# Patient Record
Sex: MALE | Race: WHITE | NOT HISPANIC OR LATINO | URBAN - METROPOLITAN AREA
[De-identification: names, ages, dates, MRNs, and addresses within clinical notes are randomized per-mention and may not be internally consistent; named-entity substitution may affect disease eponyms.]

---

## 2018-09-19 ENCOUNTER — EMERGENCY (EMERGENCY)
Facility: HOSPITAL | Age: 48
LOS: 1 days | Discharge: HOME | End: 2018-09-19
Attending: EMERGENCY MEDICINE | Admitting: EMERGENCY MEDICINE
Payer: COMMERCIAL

## 2018-09-19 VITALS
RESPIRATION RATE: 17 BRPM | SYSTOLIC BLOOD PRESSURE: 102 MMHG | DIASTOLIC BLOOD PRESSURE: 73 MMHG | HEART RATE: 56 BPM | TEMPERATURE: 98 F | OXYGEN SATURATION: 100 %

## 2018-09-19 VITALS
OXYGEN SATURATION: 100 % | TEMPERATURE: 98 F | SYSTOLIC BLOOD PRESSURE: 153 MMHG | RESPIRATION RATE: 18 BRPM | DIASTOLIC BLOOD PRESSURE: 97 MMHG | HEART RATE: 81 BPM

## 2018-09-19 PROBLEM — Z00.00 ENCOUNTER FOR PREVENTIVE HEALTH EXAMINATION: Status: ACTIVE | Noted: 2018-09-19

## 2018-09-19 PROCEDURE — 99284 EMERGENCY DEPT VISIT MOD MDM: CPT

## 2018-09-19 RX ORDER — DIPHENHYDRAMINE HCL 50 MG
50 CAPSULE ORAL ONCE
Qty: 0 | Refills: 0 | Status: COMPLETED | OUTPATIENT
Start: 2018-09-19 | End: 2018-09-19

## 2018-09-19 RX ORDER — METOCLOPRAMIDE HCL 10 MG
10 TABLET ORAL ONCE
Qty: 0 | Refills: 0 | Status: COMPLETED | OUTPATIENT
Start: 2018-09-19 | End: 2018-09-19

## 2018-09-19 RX ORDER — SODIUM CHLORIDE 9 MG/ML
1000 INJECTION INTRAMUSCULAR; INTRAVENOUS; SUBCUTANEOUS ONCE
Qty: 0 | Refills: 0 | Status: COMPLETED | OUTPATIENT
Start: 2018-09-19 | End: 2018-09-19

## 2018-09-19 RX ORDER — OXYCODONE HYDROCHLORIDE 5 MG/1
5 TABLET ORAL ONCE
Qty: 0 | Refills: 0 | Status: DISCONTINUED | OUTPATIENT
Start: 2018-09-19 | End: 2018-09-19

## 2018-09-19 RX ORDER — KETOROLAC TROMETHAMINE 30 MG/ML
30 SYRINGE (ML) INJECTION ONCE
Qty: 0 | Refills: 0 | Status: DISCONTINUED | OUTPATIENT
Start: 2018-09-19 | End: 2018-09-19

## 2018-09-19 RX ADMIN — OXYCODONE HYDROCHLORIDE 5 MILLIGRAM(S): 5 TABLET ORAL at 10:06

## 2018-09-19 RX ADMIN — SODIUM CHLORIDE 3000 MILLILITER(S): 9 INJECTION INTRAMUSCULAR; INTRAVENOUS; SUBCUTANEOUS at 09:01

## 2018-09-19 RX ADMIN — Medication 50 MILLIGRAM(S): at 10:06

## 2018-09-19 RX ADMIN — OXYCODONE HYDROCHLORIDE 5 MILLIGRAM(S): 5 TABLET ORAL at 09:30

## 2018-09-19 RX ADMIN — Medication 10 MILLIGRAM(S): at 09:01

## 2018-09-19 RX ADMIN — Medication 30 MILLIGRAM(S): at 09:01

## 2018-09-19 RX ADMIN — Medication 30 MILLIGRAM(S): at 09:30

## 2018-09-19 NOTE — ED PROVIDER NOTE - PHYSICAL EXAMINATION
ENT: right TM effusion no erythema or sign of infection  Neuro: cranial nerves intact except pt blind and cannot access accomodation  5/5 strength   limited exam due to blindness 5/5 strength   limited exam due to blindness

## 2018-09-19 NOTE — ED PROVIDER NOTE - MEDICAL DECISION MAKING DETAILS
49 y/o M with PMHx of blindness and cluster headache presents to the ED complaining of right sided headache since 2 am today. Plan. treat symptomatically and reassess.

## 2018-09-19 NOTE — ED PROVIDER NOTE - OBJECTIVE STATEMENT
47 y/o M with PMHx of blindness and cluster headache presents to the ED complaining of right sided headache since 2 am today. Pt also reports nausea but no vomiting. Pt states his headaches usually began after ear infections. Pt states his most recent ear infection was 3 weeks ago while traveling to [place]. Pt states the headache initially began after the ear infection. Pt reports taking Tylenol for headache with no relief. Pt denies fever, chills, diarrhea or any other medical problems. 49 y/o M with PMHx of blindness and cluster headaches presents to the ED complaining of right sided headache since 2 am today. Pt also reports nausea but no vomiting. Pt states his headaches usually begin after ear infections. Pt states his most recent ear infection was 3 weeks ago while traveling to [place]. Pt states the headache initially began after the ear infection and has been intermittent but manageable at home until today. Pt reports taking Tylenol for headache with no relief. He normally take oxycodone but threw out his prescription because it . Pt denies fever, chills, and current ear pain.

## 2018-09-19 NOTE — ED PROVIDER NOTE - PROGRESS NOTE DETAILS
Delia: Pt headache improved but still 8/10 will give additional pain medication and reassess. Delia: Pt feeling much better will d.c home.

## 2018-12-15 ENCOUNTER — EMERGENCY (EMERGENCY)
Facility: HOSPITAL | Age: 48
LOS: 1 days | Discharge: HOME | End: 2018-12-15
Attending: EMERGENCY MEDICINE | Admitting: EMERGENCY MEDICINE
Payer: COMMERCIAL

## 2018-12-15 VITALS
HEART RATE: 85 BPM | SYSTOLIC BLOOD PRESSURE: 146 MMHG | TEMPERATURE: 98 F | DIASTOLIC BLOOD PRESSURE: 75 MMHG | OXYGEN SATURATION: 100 % | RESPIRATION RATE: 18 BRPM

## 2018-12-15 VITALS
DIASTOLIC BLOOD PRESSURE: 64 MMHG | HEART RATE: 69 BPM | SYSTOLIC BLOOD PRESSURE: 108 MMHG | OXYGEN SATURATION: 98 % | RESPIRATION RATE: 18 BRPM | TEMPERATURE: 98 F

## 2018-12-15 PROBLEM — G44.009 CLUSTER HEADACHE SYNDROME, UNSPECIFIED, NOT INTRACTABLE: Chronic | Status: ACTIVE | Noted: 2018-09-19

## 2018-12-15 PROBLEM — H54.7 UNSPECIFIED VISUAL LOSS: Chronic | Status: ACTIVE | Noted: 2018-09-19

## 2018-12-15 LAB
ALBUMIN SERPL ELPH-MCNC: 4.4 G/DL — SIGNIFICANT CHANGE UP (ref 3.3–5)
ALP SERPL-CCNC: 78 U/L — SIGNIFICANT CHANGE UP (ref 40–120)
ALT FLD-CCNC: 15 U/L — SIGNIFICANT CHANGE UP (ref 4–41)
APTT BLD: 31.9 SEC — SIGNIFICANT CHANGE UP (ref 27.5–36.3)
AST SERPL-CCNC: 15 U/L — SIGNIFICANT CHANGE UP (ref 4–40)
BASOPHILS # BLD AUTO: 0.03 K/UL — SIGNIFICANT CHANGE UP (ref 0–0.2)
BASOPHILS NFR BLD AUTO: 0.6 % — SIGNIFICANT CHANGE UP (ref 0–2)
BILIRUB SERPL-MCNC: 0.9 MG/DL — SIGNIFICANT CHANGE UP (ref 0.2–1.2)
BUN SERPL-MCNC: 14 MG/DL — SIGNIFICANT CHANGE UP (ref 7–23)
CALCIUM SERPL-MCNC: 9.5 MG/DL — SIGNIFICANT CHANGE UP (ref 8.4–10.5)
CHLORIDE SERPL-SCNC: 103 MMOL/L — SIGNIFICANT CHANGE UP (ref 98–107)
CO2 SERPL-SCNC: 25 MMOL/L — SIGNIFICANT CHANGE UP (ref 22–31)
CREAT SERPL-MCNC: 0.98 MG/DL — SIGNIFICANT CHANGE UP (ref 0.5–1.3)
EOSINOPHIL # BLD AUTO: 0.12 K/UL — SIGNIFICANT CHANGE UP (ref 0–0.5)
EOSINOPHIL NFR BLD AUTO: 2.2 % — SIGNIFICANT CHANGE UP (ref 0–6)
GLUCOSE SERPL-MCNC: 93 MG/DL — SIGNIFICANT CHANGE UP (ref 70–99)
HCT VFR BLD CALC: 39.2 % — SIGNIFICANT CHANGE UP (ref 39–50)
HGB BLD-MCNC: 13.6 G/DL — SIGNIFICANT CHANGE UP (ref 13–17)
IMM GRANULOCYTES # BLD AUTO: 0.02 # — SIGNIFICANT CHANGE UP
IMM GRANULOCYTES NFR BLD AUTO: 0.4 % — SIGNIFICANT CHANGE UP (ref 0–1.5)
INR BLD: 1.05 — SIGNIFICANT CHANGE UP (ref 0.88–1.17)
LIDOCAIN IGE QN: 31.7 U/L — SIGNIFICANT CHANGE UP (ref 7–60)
LYMPHOCYTES # BLD AUTO: 1.26 K/UL — SIGNIFICANT CHANGE UP (ref 1–3.3)
LYMPHOCYTES # BLD AUTO: 23.5 % — SIGNIFICANT CHANGE UP (ref 13–44)
MCHC RBC-ENTMCNC: 29.5 PG — SIGNIFICANT CHANGE UP (ref 27–34)
MCHC RBC-ENTMCNC: 34.7 % — SIGNIFICANT CHANGE UP (ref 32–36)
MCV RBC AUTO: 85 FL — SIGNIFICANT CHANGE UP (ref 80–100)
MONOCYTES # BLD AUTO: 0.48 K/UL — SIGNIFICANT CHANGE UP (ref 0–0.9)
MONOCYTES NFR BLD AUTO: 8.9 % — SIGNIFICANT CHANGE UP (ref 2–14)
NEUTROPHILS # BLD AUTO: 3.46 K/UL — SIGNIFICANT CHANGE UP (ref 1.8–7.4)
NEUTROPHILS NFR BLD AUTO: 64.4 % — SIGNIFICANT CHANGE UP (ref 43–77)
NRBC # FLD: 0 — SIGNIFICANT CHANGE UP
PLATELET # BLD AUTO: 136 K/UL — LOW (ref 150–400)
PMV BLD: 10.7 FL — SIGNIFICANT CHANGE UP (ref 7–13)
POTASSIUM SERPL-MCNC: 4.1 MMOL/L — SIGNIFICANT CHANGE UP (ref 3.5–5.3)
POTASSIUM SERPL-SCNC: 4.1 MMOL/L — SIGNIFICANT CHANGE UP (ref 3.5–5.3)
PROT SERPL-MCNC: 7.2 G/DL — SIGNIFICANT CHANGE UP (ref 6–8.3)
PROTHROM AB SERPL-ACNC: 11.7 SEC — SIGNIFICANT CHANGE UP (ref 9.8–13.1)
RBC # BLD: 4.61 M/UL — SIGNIFICANT CHANGE UP (ref 4.2–5.8)
RBC # FLD: 12.7 % — SIGNIFICANT CHANGE UP (ref 10.3–14.5)
SODIUM SERPL-SCNC: 142 MMOL/L — SIGNIFICANT CHANGE UP (ref 135–145)
WBC # BLD: 5.37 K/UL — SIGNIFICANT CHANGE UP (ref 3.8–10.5)
WBC # FLD AUTO: 5.37 K/UL — SIGNIFICANT CHANGE UP (ref 3.8–10.5)

## 2018-12-15 PROCEDURE — 99284 EMERGENCY DEPT VISIT MOD MDM: CPT

## 2018-12-15 PROCEDURE — 74177 CT ABD & PELVIS W/CONTRAST: CPT | Mod: 26

## 2018-12-15 RX ORDER — MOXIFLOXACIN HYDROCHLORIDE TABLETS, 400 MG 400 MG/1
1 TABLET, FILM COATED ORAL
Qty: 20 | Refills: 0
Start: 2018-12-15 | End: 2021-08-13

## 2018-12-15 RX ORDER — METRONIDAZOLE 500 MG
500 TABLET ORAL ONCE
Qty: 0 | Refills: 0 | Status: COMPLETED | OUTPATIENT
Start: 2018-12-15 | End: 2018-12-15

## 2018-12-15 RX ORDER — SODIUM CHLORIDE 9 MG/ML
1000 INJECTION INTRAMUSCULAR; INTRAVENOUS; SUBCUTANEOUS ONCE
Qty: 0 | Refills: 0 | Status: COMPLETED | OUTPATIENT
Start: 2018-12-15 | End: 2018-12-15

## 2018-12-15 RX ORDER — MOXIFLOXACIN HYDROCHLORIDE TABLETS, 400 MG 400 MG/1
1 TABLET, FILM COATED ORAL
Qty: 20 | Refills: 0 | OUTPATIENT
Start: 2018-12-15 | End: 2018-12-24

## 2018-12-15 RX ORDER — MOXIFLOXACIN HYDROCHLORIDE TABLETS, 400 MG 400 MG/1
1 TABLET, FILM COATED ORAL
Qty: 20 | Refills: 0
Start: 2018-12-15 | End: 2018-12-24

## 2018-12-15 RX ORDER — METRONIDAZOLE 500 MG
1 TABLET ORAL
Qty: 20 | Refills: 0 | OUTPATIENT
Start: 2018-12-15 | End: 2018-12-24

## 2018-12-15 RX ORDER — KETOROLAC TROMETHAMINE 30 MG/ML
30 SYRINGE (ML) INJECTION ONCE
Qty: 0 | Refills: 0 | Status: DISCONTINUED | OUTPATIENT
Start: 2018-12-15 | End: 2018-12-15

## 2018-12-15 RX ORDER — CIPROFLOXACIN LACTATE 400MG/40ML
500 VIAL (ML) INTRAVENOUS ONCE
Qty: 0 | Refills: 0 | Status: COMPLETED | OUTPATIENT
Start: 2018-12-15 | End: 2018-12-15

## 2018-12-15 RX ORDER — METRONIDAZOLE 500 MG
1 TABLET ORAL
Qty: 20 | Refills: 0
Start: 2018-12-15 | End: 2018-12-24

## 2018-12-15 RX ADMIN — Medication 30 MILLIGRAM(S): at 11:26

## 2018-12-15 RX ADMIN — Medication 500 MILLIGRAM(S): at 15:25

## 2018-12-15 RX ADMIN — SODIUM CHLORIDE 1000 MILLILITER(S): 9 INJECTION INTRAMUSCULAR; INTRAVENOUS; SUBCUTANEOUS at 11:27

## 2018-12-15 NOTE — ED PROVIDER NOTE - CARE PROVIDER_API CALL
Douglas Kauffman), Internal Medicine  [address] [place] [address]  Phone: [phone]  Fax: [phone]

## 2018-12-15 NOTE — ED PROVIDER NOTE - PROGRESS NOTE DETAILS
Last visit the patient describes some left heel pain. After interventions, this has resolved.   Marjorie CONNER: Patient reassessed - states pain has 'curbed' after medication but still feels it. Declining pain medication at this time. Awaiting CT. Labs results discussed in detail with patient. Marjorie CONNER: Discussed with radiology - hydronephrosis is chronic similar to 2009 scan. Marjorie CONNER: Results discussed in detail with patient and his wife. They were provided a copy. Options discussed - PO Abx and outpatient follow up vs CDU observation stay for IV antibiotics, pain control. Patient prefers to be discharged and follow up. He understands to return for worsening pain, fever, vomiting, new symptoms of concern. Will give first dose of antibiotics here. He has a GI doctor to follow up with. Marjorie CONNER: Attempted to call Dr. Kauffman to inform of work up and results at [phone]. Office is now closed.

## 2018-12-15 NOTE — ED PROVIDER NOTE - NSFOLLOWUPINSTRUCTIONS_ED_ALL_ED_FT
Please follow up with your GI doctor. Return for worsening symptoms of fever, abdominal pain, vomiting or new symptoms of concern.

## 2018-12-15 NOTE — ED PROVIDER NOTE - MEDICAL DECISION MAKING DETAILS
hx of diverticulitis, ttp in LLQ - concern for diverticulitis. Pt has hx of abscess w/o need for IR or surgical drainage. Will order labs including coags and check CT A/P. Pt does not like narcotic pain medication, agreeable to toradol for pain.

## 2018-12-15 NOTE — ED ADULT TRIAGE NOTE - CHIEF COMPLAINT QUOTE
pt c/o lower mid abd pain since yesterday.  pt was seen at PMD office today and was told to come to ED for evaluation of possible diverticulitis flare.  pt denies n/v/d

## 2018-12-15 NOTE — ED PROVIDER NOTE - OBJECTIVE STATEMENT
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 He states he has been fasting for his doctor's appointment today with his pcp, Dr. Douglas Kauffman. Denies fevers, nausea, vomiting. At the appointment, his pcp felt he may have diverticulitis or appendicitis and sent him in for evaluation. No diarrhea.

## 2019-12-09 ENCOUNTER — EMERGENCY (EMERGENCY)
Facility: HOSPITAL | Age: 49
LOS: 1 days | Discharge: HOME | End: 2019-12-09
Attending: EMERGENCY MEDICINE | Admitting: EMERGENCY MEDICINE
Payer: COMMERCIAL

## 2019-12-09 VITALS
SYSTOLIC BLOOD PRESSURE: 147 MMHG | DIASTOLIC BLOOD PRESSURE: 97 MMHG | HEART RATE: 92 BPM | TEMPERATURE: 98 F | RESPIRATION RATE: 18 BRPM | OXYGEN SATURATION: 99 %

## 2019-12-09 VITALS
RESPIRATION RATE: 18 BRPM | TEMPERATURE: 98 F | SYSTOLIC BLOOD PRESSURE: 132 MMHG | DIASTOLIC BLOOD PRESSURE: 79 MMHG | HEART RATE: 78 BPM | OXYGEN SATURATION: 97 %

## 2019-12-09 LAB
ALBUMIN SERPL ELPH-MCNC: 4.2 G/DL — SIGNIFICANT CHANGE UP (ref 3.3–5)
ALP SERPL-CCNC: 79 U/L — SIGNIFICANT CHANGE UP (ref 40–120)
ALT FLD-CCNC: 14 U/L — SIGNIFICANT CHANGE UP (ref 4–41)
ANION GAP SERPL CALC-SCNC: 15 MMO/L — HIGH (ref 7–14)
APPEARANCE UR: CLEAR — SIGNIFICANT CHANGE UP
AST SERPL-CCNC: 18 U/L — SIGNIFICANT CHANGE UP (ref 4–40)
BASE EXCESS BLDV CALC-SCNC: 0.5 MMOL/L — SIGNIFICANT CHANGE UP
BASOPHILS # BLD AUTO: 0.03 K/UL — SIGNIFICANT CHANGE UP (ref 0–0.2)
BASOPHILS NFR BLD AUTO: 0.4 % — SIGNIFICANT CHANGE UP (ref 0–2)
BILIRUB SERPL-MCNC: 0.4 MG/DL — SIGNIFICANT CHANGE UP (ref 0.2–1.2)
BILIRUB UR-MCNC: NEGATIVE — SIGNIFICANT CHANGE UP
BLOOD GAS VENOUS - CREATININE: 0.9 MG/DL — SIGNIFICANT CHANGE UP (ref 0.5–1.3)
BLOOD GAS VENOUS - FIO2: 21 — SIGNIFICANT CHANGE UP
BLOOD UR QL VISUAL: NEGATIVE — SIGNIFICANT CHANGE UP
BUN SERPL-MCNC: 13 MG/DL — SIGNIFICANT CHANGE UP (ref 7–23)
CALCIUM SERPL-MCNC: 9.5 MG/DL — SIGNIFICANT CHANGE UP (ref 8.4–10.5)
CHLORIDE BLDV-SCNC: 108 MMOL/L — SIGNIFICANT CHANGE UP (ref 96–108)
CHLORIDE SERPL-SCNC: 104 MMOL/L — SIGNIFICANT CHANGE UP (ref 98–107)
CO2 SERPL-SCNC: 21 MMOL/L — LOW (ref 22–31)
COLOR SPEC: SIGNIFICANT CHANGE UP
CREAT SERPL-MCNC: 0.9 MG/DL — SIGNIFICANT CHANGE UP (ref 0.5–1.3)
EOSINOPHIL # BLD AUTO: 0.24 K/UL — SIGNIFICANT CHANGE UP (ref 0–0.5)
EOSINOPHIL NFR BLD AUTO: 3.3 % — SIGNIFICANT CHANGE UP (ref 0–6)
GAS PNL BLDV: 139 MMOL/L — SIGNIFICANT CHANGE UP (ref 136–146)
GLUCOSE BLDV-MCNC: 89 MG/DL — SIGNIFICANT CHANGE UP (ref 70–99)
GLUCOSE SERPL-MCNC: 90 MG/DL — SIGNIFICANT CHANGE UP (ref 70–99)
GLUCOSE UR-MCNC: NEGATIVE — SIGNIFICANT CHANGE UP
HCO3 BLDV-SCNC: 24 MMOL/L — SIGNIFICANT CHANGE UP (ref 20–27)
HCT VFR BLD CALC: 42 % — SIGNIFICANT CHANGE UP (ref 39–50)
HCT VFR BLDV CALC: 44.6 % — SIGNIFICANT CHANGE UP (ref 39–51)
HGB BLD-MCNC: 14.4 G/DL — SIGNIFICANT CHANGE UP (ref 13–17)
HGB BLDV-MCNC: 14.5 G/DL — SIGNIFICANT CHANGE UP (ref 13–17)
IMM GRANULOCYTES NFR BLD AUTO: 0.3 % — SIGNIFICANT CHANGE UP (ref 0–1.5)
KETONES UR-MCNC: NEGATIVE — SIGNIFICANT CHANGE UP
LACTATE BLDV-MCNC: 3.9 MMOL/L — HIGH (ref 0.5–2)
LEUKOCYTE ESTERASE UR-ACNC: NEGATIVE — SIGNIFICANT CHANGE UP
LIDOCAIN IGE QN: 29.3 U/L — SIGNIFICANT CHANGE UP (ref 7–60)
LYMPHOCYTES # BLD AUTO: 1.87 K/UL — SIGNIFICANT CHANGE UP (ref 1–3.3)
LYMPHOCYTES # BLD AUTO: 26 % — SIGNIFICANT CHANGE UP (ref 13–44)
MCHC RBC-ENTMCNC: 29.4 PG — SIGNIFICANT CHANGE UP (ref 27–34)
MCHC RBC-ENTMCNC: 34.3 % — SIGNIFICANT CHANGE UP (ref 32–36)
MCV RBC AUTO: 85.9 FL — SIGNIFICANT CHANGE UP (ref 80–100)
MONOCYTES # BLD AUTO: 0.68 K/UL — SIGNIFICANT CHANGE UP (ref 0–0.9)
MONOCYTES NFR BLD AUTO: 9.5 % — SIGNIFICANT CHANGE UP (ref 2–14)
NEUTROPHILS # BLD AUTO: 4.34 K/UL — SIGNIFICANT CHANGE UP (ref 1.8–7.4)
NEUTROPHILS NFR BLD AUTO: 60.5 % — SIGNIFICANT CHANGE UP (ref 43–77)
NITRITE UR-MCNC: NEGATIVE — SIGNIFICANT CHANGE UP
NRBC # FLD: 0 K/UL — SIGNIFICANT CHANGE UP (ref 0–0)
PCO2 BLDV: 48 MMHG — SIGNIFICANT CHANGE UP (ref 41–51)
PH BLDV: 7.34 PH — SIGNIFICANT CHANGE UP (ref 7.32–7.43)
PH UR: 6 — SIGNIFICANT CHANGE UP (ref 5–8)
PLATELET # BLD AUTO: 154 K/UL — SIGNIFICANT CHANGE UP (ref 150–400)
PMV BLD: 10.3 FL — SIGNIFICANT CHANGE UP (ref 7–13)
PO2 BLDV: 40 MMHG — SIGNIFICANT CHANGE UP (ref 35–40)
POTASSIUM BLDV-SCNC: 3.7 MMOL/L — SIGNIFICANT CHANGE UP (ref 3.4–4.5)
POTASSIUM SERPL-MCNC: 4.1 MMOL/L — SIGNIFICANT CHANGE UP (ref 3.5–5.3)
POTASSIUM SERPL-SCNC: 4.1 MMOL/L — SIGNIFICANT CHANGE UP (ref 3.5–5.3)
PROT SERPL-MCNC: 7.1 G/DL — SIGNIFICANT CHANGE UP (ref 6–8.3)
PROT UR-MCNC: NEGATIVE — SIGNIFICANT CHANGE UP
RBC # BLD: 4.89 M/UL — SIGNIFICANT CHANGE UP (ref 4.2–5.8)
RBC # FLD: 12.7 % — SIGNIFICANT CHANGE UP (ref 10.3–14.5)
SAO2 % BLDV: 70.5 % — SIGNIFICANT CHANGE UP (ref 60–85)
SODIUM SERPL-SCNC: 140 MMOL/L — SIGNIFICANT CHANGE UP (ref 135–145)
SP GR SPEC: 1.01 — SIGNIFICANT CHANGE UP (ref 1–1.04)
UROBILINOGEN FLD QL: NORMAL — SIGNIFICANT CHANGE UP
WBC # BLD: 7.18 K/UL — SIGNIFICANT CHANGE UP (ref 3.8–10.5)
WBC # FLD AUTO: 7.18 K/UL — SIGNIFICANT CHANGE UP (ref 3.8–10.5)

## 2019-12-09 PROCEDURE — 99284 EMERGENCY DEPT VISIT MOD MDM: CPT

## 2019-12-09 RX ORDER — CIPROFLOXACIN LACTATE 400MG/40ML
1 VIAL (ML) INTRAVENOUS
Qty: 20 | Refills: 0
Start: 2019-12-09 | End: 2019-12-18

## 2019-12-09 RX ORDER — METRONIDAZOLE 500 MG
1 TABLET ORAL
Qty: 20 | Refills: 0
Start: 2019-12-09 | End: 2019-12-18

## 2019-12-09 RX ORDER — ACETAMINOPHEN 500 MG
650 TABLET ORAL ONCE
Refills: 0 | Status: COMPLETED | OUTPATIENT
Start: 2019-12-09 | End: 2019-12-09

## 2019-12-09 RX ORDER — METRONIDAZOLE 500 MG
500 TABLET ORAL ONCE
Refills: 0 | Status: COMPLETED | OUTPATIENT
Start: 2019-12-09 | End: 2019-12-09

## 2019-12-09 RX ORDER — KETOROLAC TROMETHAMINE 30 MG/ML
15 SYRINGE (ML) INJECTION ONCE
Refills: 0 | Status: DISCONTINUED | OUTPATIENT
Start: 2019-12-09 | End: 2019-12-09

## 2019-12-09 RX ORDER — SODIUM CHLORIDE 9 MG/ML
1000 INJECTION INTRAMUSCULAR; INTRAVENOUS; SUBCUTANEOUS ONCE
Refills: 0 | Status: COMPLETED | OUTPATIENT
Start: 2019-12-09 | End: 2019-12-09

## 2019-12-09 RX ORDER — CIPROFLOXACIN LACTATE 400MG/40ML
500 VIAL (ML) INTRAVENOUS ONCE
Refills: 0 | Status: COMPLETED | OUTPATIENT
Start: 2019-12-09 | End: 2019-12-09

## 2019-12-09 RX ORDER — METRONIDAZOLE 500 MG
1 TABLET ORAL
Qty: 20 | Refills: 0
Start: 2019-12-09 | End: 2021-08-13

## 2019-12-09 RX ADMIN — Medication 650 MILLIGRAM(S): at 08:06

## 2019-12-09 RX ADMIN — SODIUM CHLORIDE 1000 MILLILITER(S): 9 INJECTION INTRAMUSCULAR; INTRAVENOUS; SUBCUTANEOUS at 08:09

## 2019-12-09 RX ADMIN — Medication 500 MILLIGRAM(S): at 08:06

## 2019-12-09 RX ADMIN — SODIUM CHLORIDE 1000 MILLILITER(S): 9 INJECTION INTRAMUSCULAR; INTRAVENOUS; SUBCUTANEOUS at 06:33

## 2019-12-09 RX ADMIN — Medication 15 MILLIGRAM(S): at 06:34

## 2019-12-09 RX ADMIN — Medication 500 MILLIGRAM(S): at 08:07

## 2019-12-09 NOTE — ED PROVIDER NOTE - CLINICAL SUMMARY MEDICAL DECISION MAKING FREE TEXT BOX
48yo M w/ pmh of medically managed diverticulitis c/o mid lower abd pain and urinary frequency. Labs, ua, meds reassess. if no significant improvement or resolution of pain, will need CT AP.

## 2019-12-09 NOTE — ED PROVIDER NOTE - OBJECTIVE STATEMENT
Pertinent PMH/PSH/FHx/SHx and Review of Systems contained within:  50yo M, pmh of diverticulitis, c/o lower abd pain X approx 1 day, sharp, feels similar to prior episode of sigmoid diverticulitis. Does report urinary frequency (but pt is on flomax for BPH). No abd surgeries.  No fever/chills, No photophobia/eye pain/changes in vision, No ear pain/sore throat/dysphagia, No chest pain/palpitations, no SOB/cough/wheeze/stridor,  No N/V/D, no dysuria/discharge, No neck/back pain, no rash, no new focal neuro symptoms.

## 2019-12-09 NOTE — ED PROVIDER NOTE - PHYSICAL EXAMINATION
Gen: Alert, NAD  Head: NC, AT,  EOMI, normal lids/conjunctiva  ENT:  normal hearing, patent oropharynx without erythema/exudate  Neck: +supple, no tenderness/meningismus/JVD, +Trachea midline  Chest: no chest wall tenderness, equal chest rise  Pulm: Bilateral BS, normal resp effort, no wheeze/stridor/retractions  CV: RRR, no M/R/G, +dist pulses  Abd: +BS, soft, ND, ttp mid lower abd, no rebound  Rectal: deferred  Mskel: no edema/erythema/cyanosis  Skin: no rash  Neuro: AAOx3

## 2019-12-09 NOTE — ED PROVIDER NOTE - PATIENT PORTAL LINK FT
You can access the FollowMyHealth Patient Portal offered by [facility] by registering at the following website: [email or web address]. By joining [employer]’s FollowMyHealth portal, you will also be able to view your health information using other applications (apps) compatible with our system.

## 2019-12-09 NOTE — ED ADULT TRIAGE NOTE - CHIEF COMPLAINT QUOTE
Patient complains of midline-lower abdominal pain x 1 day, denies nausea/vomiting/diarrhea.  Endorses frequent urination, on Flomax for BPH.  States has a history of diverticulitis and this feels similar.  Denies abdominal surgeries.  Legally blind.

## 2019-12-09 NOTE — ED ADULT NURSE NOTE - OBJECTIVE STATEMENT
Pt having abdominal pain x 1 day. Hx of diverticulitis. Pt stating the pain feels the same has previous diverticulitis.

## 2019-12-09 NOTE — ED PROVIDER NOTE - PROGRESS NOTE DETAILS
Reassessed pt, stated pain down from "11/10 to 2/10" after toradol. Re-examined, only very mild ttp suprapubic. Discussed discharging home without  CT, treating w/ pain meds/abx and outpt GI f/u and pt agrees with plan. Gave return precautions.   -Dr. Mustafa

## 2019-12-09 NOTE — ED PROVIDER NOTE - NSFOLLOWUPINSTRUCTIONS_ED_ALL_ED_FT
Your symptoms are most consistent with a recurrence of your diverticulitis.     Please take the antibiotics as prescribed.    Take tylenol (650mg every 8 hours as needed for pain) or ibuprofen (600mg every 8 hours as needed for pain).  If one of these medications alone does not control your pain, you may take both at the same time.    Follow up with your primary doctor as soon as possible.    Return to the emergency department if you develop fever, chills, worsening pain despite taking the above medications, or if you develop any other symptoms that are concerning to you.

## 2021-05-24 NOTE — ED ADULT TRIAGE NOTE - PRO INTERPRETER NEED 2
English Double O-Z Flap Text: The defect edges were debeveled with a #15 scalpel blade.  Given the location of the defect, shape of the defect and the proximity to free margins a Double O-Z flap was deemed most appropriate.  Using a sterile surgical marker, an appropriate transposition flap was drawn incorporating the defect and placing the expected incisions within the relaxed skin tension lines where possible. The area thus outlined was incised deep to adipose tissue with a #15 scalpel blade.  The skin margins were undermined to an appropriate distance in all directions utilizing iris scissors.

## 2021-05-28 NOTE — ED PROVIDER NOTE - DATE/TIME 1
Progress Notes by Mehul Del Toro MD at 5/8/2019 7:45 AM   Author: Mehul Del Toro MD Author Type: Physician Filed: 5/8/2019  9:16 AM   Note Status: Signed Cosign: Cosign Not Required Encounter Date: 5/8/2019   : Mehul Del Toro MD (Physician)      Please inform patient that the blood pressure is in desired range.  Continue with current medications and plan.  Mehul Del Toro MD  5/8/2019           19-Sep-2018 09:48

## 2021-06-26 ENCOUNTER — EMERGENCY (EMERGENCY)
Facility: HOSPITAL | Age: 51
LOS: 1 days | Discharge: HOME | End: 2021-06-26
Attending: STUDENT IN AN ORGANIZED HEALTH CARE EDUCATION/TRAINING PROGRAM | Admitting: STUDENT IN AN ORGANIZED HEALTH CARE EDUCATION/TRAINING PROGRAM
Payer: COMMERCIAL

## 2021-06-26 VITALS
SYSTOLIC BLOOD PRESSURE: 143 MMHG | DIASTOLIC BLOOD PRESSURE: 81 MMHG | TEMPERATURE: 101 F | OXYGEN SATURATION: 99 % | HEART RATE: 106 BPM | RESPIRATION RATE: 19 BRPM

## 2021-06-26 PROCEDURE — 99284 EMERGENCY DEPT VISIT MOD MDM: CPT

## 2021-06-26 RX ORDER — SODIUM CHLORIDE 9 MG/ML
1000 INJECTION INTRAMUSCULAR; INTRAVENOUS; SUBCUTANEOUS ONCE
Refills: 0 | Status: COMPLETED | OUTPATIENT
Start: 2021-06-26 | End: 2021-06-26

## 2021-06-26 RX ORDER — MORPHINE SULFATE 50 MG/1
4 CAPSULE, EXTENDED RELEASE ORAL ONCE
Refills: 0 | Status: DISCONTINUED | OUTPATIENT
Start: 2021-06-26 | End: 2021-06-26

## 2021-06-26 RX ORDER — ACETAMINOPHEN 500 MG
650 TABLET ORAL ONCE
Refills: 0 | Status: COMPLETED | OUTPATIENT
Start: 2021-06-26 | End: 2021-06-26

## 2021-06-26 NOTE — ED PROVIDER NOTE - PHYSICAL EXAMINATION
Gen: Alert and oriented. Lying comfortably in bed. Answering questions appropriately  HEENT: extra occular movements intact, no nasal discharge, mucous membranes moist  CV: Regular rate and rhythm, +S1/S2, no murmurs/rubs/gallops,   Resp: Clear to ausculation bilaterally, no wheezes/rhonchi/rales  GI: Abdomen soft non-distended, ttp in suprapubic  and LLQ, no masses  MSK: No open wounds, no bruising, no LE edema, Homans sign negative bl  Neuro: A&Ox4, following commands, moving all four extremities spontaneously  Psych: appropriate mood

## 2021-06-26 NOTE — ED PROVIDER NOTE - NSFOLLOWUPINSTRUCTIONS_ED_ALL_ED_FT
You have diverticulitis.    Please  your antibiotic and take as directed.    You may use tylenol for pain. Please do not exceed 3250 mg in 24 hours. You may use ibuprofen for pain. Please do not exceed 3000 mg in 24 hours. For severe pain you may take oxycodone.    Please follow up with your GI for further evaluation.    PLEASE FOLLOW UP WITH YOUR UROLOGIST ASAP FOR HYDRONEPHROSIS. This condition may lead to irreversible kidney damage so it is important you follow up ASAP.     Diverticulitis    Diverticulitis is inflammation or infection of small pouches in your colon that form when you HAVE a condition called diverticulosis. This condition can range from mild to severe potentially leading to perforation or obstructions of your colon. Symptoms include abdominal pain, fever/chills, nausea, vomiting, diarrhea, constipation, or blood in your stool. If you were prescribed an antibiotic medicine, take it as told by your health care provider. Do not stop taking the antibiotic even if you start to feel better.    SEEK IMMEDIATE MEDICAL CARE IF YOU HAVE ANY OF THE FOLLOWING SYMPTOMS: worsening abdominal pain, high fever, inability to hold down liquids or medication, black or bloody stools, inability to pass gas, lightheadedness/dizziness, or a change in mental status.

## 2021-06-26 NOTE — ED PROVIDER NOTE - ATTENDING CONTRIBUTION TO CARE
52 yo M past medical history blindness from retinitis pigmentosa hx of diverticulitis presents for suprapubic pain x 3 days. denies fever chills but febrile in ED. denies cp, sob n/v/d/c, +urinary frequency and burning. exam as above. Ddx includes, but not limited to, diverticulitis, uti. plan: symptom relief prn, labs, ct, abx, reassess.

## 2021-06-26 NOTE — ED PROVIDER NOTE - NSFOLLOWUPCLINICS_GEN_ALL_ED_FT
[facility]  Urology  [address] [place] [address] - [address] [place] [address]  Phone: [phone]  Fax:   Follow Up Time: Urgent

## 2021-06-26 NOTE — ED PROVIDER NOTE - PROGRESS NOTE DETAILS
Joseph Frankel PGY2: Patient feeling better and tolerating PO. CT back with diverticulitis. Also showing progression of hydronephrosis of kidney to severe. Patient states he is aware of having hydronephrosis in the past and went to a urologist two summers ago where he did an US and "nothing came of it". Offered patient to be seen by urology given the progression but patient rather go home and follow up rapidly next week. Relayed to patient the importance of following up ASAP for this as it can cause irreversible kidney damage. Patient voiced his understanding and agreed to follow up ASAP. Has urologist he can follow with. He also would like referral information for [facility]. Will put him in for rapid referral and give him urology contact. Discussed plan and return precautions with patient who understands and agrees. All questions answered.

## 2021-06-26 NOTE — ED PROVIDER NOTE - NS ED ROS FT
Gen: Denies fever, +generalized weakness  CV: Denies chest pain, palpitations  HEENT: Denies neck pain, headache, vision changes  Skin: Denies rash, erythema, color changes  Resp: Denies SOB, cough  Endo: Denies sensitivity to heat, cold, increased urination  GI: Denies constipation, nausea, vomiting, diarrhea  Msk: Denies back pain, LE swelling, extremity pain  : Endorses dysuria, increased frequency  Neuro: Denies LOC, focal weakness, numbness, tingling  Psych: Denies hx of psych, SI, HI

## 2021-06-26 NOTE — ED PROVIDER NOTE - CLINICAL SUMMARY MEDICAL DECISION MAKING FREE TEXT BOX
Joseph Frankel PGY2: 50 yo M with suprapubic pain. Febrile and mildly tachy. Otherwise VSS. Patient looks well and is non toxic appearing. PE as above. Consider UTI vs diverticulitis. Will get labs, consider imaging based on UA, and treat symptomatically. Will reassess.

## 2021-06-26 NOTE — ED PROVIDER NOTE - OBJECTIVE STATEMENT
52 yo M with retinitis pigmentosa (legally blind), prior diverticulitis presenting for suprapubic pain x 3 days. Moderate to severe. Burning in nature. No modifying factors. Associated with chills, dysuria and increased frequency. States this is what his diverticulitis normally feels like. Denies n/v.

## 2021-06-26 NOTE — ED ADULT TRIAGE NOTE - CHIEF COMPLAINT QUOTE
Pt c/o sharp, mid-lower abdominal pain starting 3 days ago. Denies N/V/D. Hx of diverticulitis. Pt is legally blind.

## 2021-06-26 NOTE — ED PROVIDER NOTE - PATIENT PORTAL LINK FT
You can access the FollowMyHealth Patient Portal offered by [facility] by registering at the following website: [email or web address]. By joining [employer]’s FollowMyHealth portal, you will also be able to view your health information using other applications (apps) compatible with our system.

## 2021-06-27 VITALS
OXYGEN SATURATION: 98 % | HEART RATE: 75 BPM | SYSTOLIC BLOOD PRESSURE: 120 MMHG | TEMPERATURE: 98 F | RESPIRATION RATE: 18 BRPM | DIASTOLIC BLOOD PRESSURE: 75 MMHG

## 2021-06-27 LAB
ALBUMIN SERPL ELPH-MCNC: 4.3 G/DL — SIGNIFICANT CHANGE UP (ref 3.3–5)
ALP SERPL-CCNC: 80 U/L — SIGNIFICANT CHANGE UP (ref 40–120)
ALT FLD-CCNC: 12 U/L — SIGNIFICANT CHANGE UP (ref 4–41)
ANION GAP SERPL CALC-SCNC: 16 MMOL/L — HIGH (ref 7–14)
APPEARANCE UR: CLEAR — SIGNIFICANT CHANGE UP
AST SERPL-CCNC: 13 U/L — SIGNIFICANT CHANGE UP (ref 4–40)
BACTERIA # UR AUTO: NEGATIVE — SIGNIFICANT CHANGE UP
BASOPHILS # BLD AUTO: 0.02 K/UL — SIGNIFICANT CHANGE UP (ref 0–0.2)
BASOPHILS NFR BLD AUTO: 0.2 % — SIGNIFICANT CHANGE UP (ref 0–2)
BILIRUB SERPL-MCNC: 0.8 MG/DL — SIGNIFICANT CHANGE UP (ref 0.2–1.2)
BILIRUB UR-MCNC: NEGATIVE — SIGNIFICANT CHANGE UP
BUN SERPL-MCNC: 13 MG/DL — SIGNIFICANT CHANGE UP (ref 7–23)
CALCIUM SERPL-MCNC: 9.5 MG/DL — SIGNIFICANT CHANGE UP (ref 8.4–10.5)
CHLORIDE SERPL-SCNC: 103 MMOL/L — SIGNIFICANT CHANGE UP (ref 98–107)
CO2 SERPL-SCNC: 21 MMOL/L — LOW (ref 22–31)
COLOR SPEC: YELLOW — SIGNIFICANT CHANGE UP
CREAT SERPL-MCNC: 1.05 MG/DL — SIGNIFICANT CHANGE UP (ref 0.5–1.3)
DIFF PNL FLD: NEGATIVE — SIGNIFICANT CHANGE UP
EOSINOPHIL # BLD AUTO: 0.1 K/UL — SIGNIFICANT CHANGE UP (ref 0–0.5)
EOSINOPHIL NFR BLD AUTO: 1.2 % — SIGNIFICANT CHANGE UP (ref 0–6)
GLUCOSE SERPL-MCNC: 106 MG/DL — HIGH (ref 70–99)
GLUCOSE UR QL: NEGATIVE — SIGNIFICANT CHANGE UP
HCT VFR BLD CALC: 40.8 % — SIGNIFICANT CHANGE UP (ref 39–50)
HGB BLD-MCNC: 13.9 G/DL — SIGNIFICANT CHANGE UP (ref 13–17)
HOROWITZ INDEX BLDV+IHG-RTO: SIGNIFICANT CHANGE UP
IANC: 5.58 K/UL — SIGNIFICANT CHANGE UP (ref 1.5–8.5)
IMM GRANULOCYTES NFR BLD AUTO: 0.2 % — SIGNIFICANT CHANGE UP (ref 0–1.5)
KETONES UR-MCNC: NEGATIVE — SIGNIFICANT CHANGE UP
LEUKOCYTE ESTERASE UR-ACNC: NEGATIVE — SIGNIFICANT CHANGE UP
LIDOCAIN IGE QN: 32 U/L — SIGNIFICANT CHANGE UP (ref 7–60)
LYMPHOCYTES # BLD AUTO: 1.67 K/UL — SIGNIFICANT CHANGE UP (ref 1–3.3)
LYMPHOCYTES # BLD AUTO: 20.4 % — SIGNIFICANT CHANGE UP (ref 13–44)
MCHC RBC-ENTMCNC: 29.1 PG — SIGNIFICANT CHANGE UP (ref 27–34)
MCHC RBC-ENTMCNC: 34.1 GM/DL — SIGNIFICANT CHANGE UP (ref 32–36)
MCV RBC AUTO: 85.4 FL — SIGNIFICANT CHANGE UP (ref 80–100)
MONOCYTES # BLD AUTO: 0.81 K/UL — SIGNIFICANT CHANGE UP (ref 0–0.9)
MONOCYTES NFR BLD AUTO: 9.9 % — SIGNIFICANT CHANGE UP (ref 2–14)
NEUTROPHILS # BLD AUTO: 5.58 K/UL — SIGNIFICANT CHANGE UP (ref 1.8–7.4)
NEUTROPHILS NFR BLD AUTO: 68.1 % — SIGNIFICANT CHANGE UP (ref 43–77)
NITRITE UR-MCNC: NEGATIVE — SIGNIFICANT CHANGE UP
NRBC # BLD: 0 /100 WBCS — SIGNIFICANT CHANGE UP
NRBC # FLD: 0 K/UL — SIGNIFICANT CHANGE UP
PH UR: 6.5 — SIGNIFICANT CHANGE UP (ref 5–8)
PLATELET # BLD AUTO: 175 K/UL — SIGNIFICANT CHANGE UP (ref 150–400)
POTASSIUM SERPL-MCNC: 3.8 MMOL/L — SIGNIFICANT CHANGE UP (ref 3.5–5.3)
POTASSIUM SERPL-SCNC: 3.8 MMOL/L — SIGNIFICANT CHANGE UP (ref 3.5–5.3)
PROT SERPL-MCNC: 6.9 G/DL — SIGNIFICANT CHANGE UP (ref 6–8.3)
PROT UR-MCNC: ABNORMAL
RBC # BLD: 4.78 M/UL — SIGNIFICANT CHANGE UP (ref 4.2–5.8)
RBC # FLD: 12.7 % — SIGNIFICANT CHANGE UP (ref 10.3–14.5)
RBC CASTS # UR COMP ASSIST: SIGNIFICANT CHANGE UP /HPF (ref 0–4)
SODIUM SERPL-SCNC: 140 MMOL/L — SIGNIFICANT CHANGE UP (ref 135–145)
SP GR SPEC: 1.02 — SIGNIFICANT CHANGE UP (ref 1.01–1.02)
UROBILINOGEN FLD QL: SIGNIFICANT CHANGE UP
WBC # BLD: 8.2 K/UL — SIGNIFICANT CHANGE UP (ref 3.8–10.5)
WBC # FLD AUTO: 8.2 K/UL — SIGNIFICANT CHANGE UP (ref 3.8–10.5)
WBC UR QL: SIGNIFICANT CHANGE UP /HPF (ref 0–5)

## 2021-06-27 PROCEDURE — 74177 CT ABD & PELVIS W/CONTRAST: CPT | Mod: 26

## 2021-06-27 RX ORDER — OXYCODONE HYDROCHLORIDE 5 MG/1
1 TABLET ORAL
Qty: 8 | Refills: 0
Start: 2021-06-27 | End: 2021-06-28

## 2021-06-27 RX ADMIN — MORPHINE SULFATE 4 MILLIGRAM(S): 50 CAPSULE, EXTENDED RELEASE ORAL at 00:07

## 2021-06-27 RX ADMIN — Medication 650 MILLIGRAM(S): at 00:07

## 2021-06-27 RX ADMIN — SODIUM CHLORIDE 1000 MILLILITER(S): 9 INJECTION INTRAMUSCULAR; INTRAVENOUS; SUBCUTANEOUS at 00:07

## 2021-06-27 RX ADMIN — Medication 1 TABLET(S): at 03:09

## 2021-06-27 NOTE — ED ADULT NURSE NOTE - TEMPLATE
For part of this note, RINA Lorenzo acted as a scribe for Dr. Aguilar under his direct supervision and review.    Date of Injury: 3 months ago  Initial Treatment date: 5/6/2019  Date last seen: 7/08/2019  Mechanism: other  Occupation:   Referred by: Insurance    SUBJECTIVE:     The patient is a pleasant 23 year old male that presents to our office today for care and treatment of low back, upper back and neck pain. Patient rates his pain today at 1/10 with 10 being the worst. Niraj states he has had increased tension in his neck and upper back bilaterally for the last 3 months.  Denies injury or trauma to the area, but notes that he is an  and his job duties sometimes aggravate his symptoms.  Has slight lack of range of motions.  Denies having headaches.  Has no problems sleeping due to the pain.  Also reports having occasional low back tension.  Has found relief with chiropractic care in the past. Niraj states he went to [facility] over the weekend and now has discomfort in his left mid back and bilateral neck.  Denies having any headaches.      OBJECTIVE FINDINGS:   Problem focus examination revealed:    (Cervical spine) Cervical spine facet joint function is within normal limits except for his C3 and C5 facet joints that exhibited limited passive range of motion and segmental restriction with tenderness upon palpation. The following muscles were examined for normal flexibility and tone; left trapezius muscle, right trapezius muscle, left levator scapulae muscle, right levator scapulae muscle, left sternocleidomastoid muscle, right sternocleidomastoid muscle, left suboccipital muscle and right suboccipital muscle; these muscles were within normal limits except for his left levator scapulae muscle, right levator scapulae muscle, left sternocleidomastoid muscle, right sternocleidomastoid muscle, left suboccipital muscle and right suboccipital muscle that exhibited limited flexibility  and were hypertonic at rest.    (Thoracic Spine) Thoracic spine facet joint function is within normal limits except for his T3, T5, T7 and T9 facet joints that exhibited limited passive range of motion and segmental restriction and tenderness upon palpation.    (lumbar spine & sacrum) Lumbar spine facet joint function is within normal limits except for his L1, L3 and L5 facet joints that exhibited limited passive range of motion and segmental restriction and tenderness on palpation; SI joint function is within normal limits except for his right SI  and left SI joint that exhibited limited passive range of motion and joint restriction; The following muscles were examined for flexibility and tone at rest; left hip flexor muscle, right hip flexor muscle, left piriformis muscle, right piriformis muscle, left quadratus lumborum muscle, right quadratus lumborum muscle, left tensor fasciae latae muscle, right tensor fasciae latae muscle, left quadriceps muscle and right quadriceps muscle;  These muscles were found to be within normal limits except for his left hip flexor muscle, right hip flexor muscle, left piriformis muscle, right piriformis muscle, left hamstring muscle, right hamstring muscle, left quadratus lumborum muscle, right quadratus lumborum muscle, left tensor fasciae latae muscle and right tensor fasciae latae muscle that exhibited limited flexibility and were hypertonic at rest.    ORTHOPEDIC/NEUROLOGICAL TESTS:     (Neck) tenderness to palpation is noted over bilateral levator scapulae muscles, bilateral cervical spine paraspinal muscles.    (Lower back) tenderness to palpation is noted over bilateral SI joints, bilateral lumbar spine paraspinal muscles.    Assessment:    1. Cervical spine pain  2. Cervical somatic dysfunction  3. Thoracic region somatic dysfunction  4. Lumbar spine pain  5. Lumbar region somatic dysfunction  6. Pelvic somatic dysfunction      Complicating factors:  deconditioning      Plan:    Patient was evaluated and then treated with manipulation to his right SI joint, left SI joint, lumbar spine, thoracic spine, cervical spine via diversified manipulation technique/Block distraction technique to improve function and passive range of motion of facet joints. Patient also treated with contract/relax stretch to muscle noted as taut in objective findings to improve flexibility and decrease strain to spinal structures. Patient also treated with lumbar distraction times 5 minutes to stretch lumbar paraspinal muscle and centralize possible contained migration of lumbar intervertebral nucleus.    Rehabilitation/Modalities:  None performed today.    Goal of care is to improve muscular and skeletal function and provide symptom relief. Patient responded well to the treatment today. Patient is to return 2 weeks for continued care and treatment.     Total exam and treatment time was 15 minutes.    This represents my complete evaluation of the patient as transcribed by RINA Lorenzo.     General

## 2021-06-27 NOTE — ED ADULT NURSE NOTE - NSIMPLEMENTINTERV_GEN_ALL_ED
Implemented All Universal Safety Interventions:  [place] to call system. Call bell, personal items and telephone within reach. Instruct patient to call for assistance. Room bathroom lighting operational. Non-slip footwear when patient is off stretcher. Physically safe environment: no spills, clutter or unnecessary equipment. Stretcher in lowest position, wheels locked, appropriate side rails in place.

## 2021-06-28 LAB
CULTURE RESULTS: NO GROWTH — SIGNIFICANT CHANGE UP
SPECIMEN SOURCE: SIGNIFICANT CHANGE UP

## 2021-07-08 ENCOUNTER — APPOINTMENT (OUTPATIENT)
Dept: UROLOGY | Facility: CLINIC | Age: 51
End: 2021-07-08
Payer: COMMERCIAL

## 2021-07-08 VITALS
HEART RATE: 74 BPM | BODY MASS INDEX: 24.38 KG/M2 | SYSTOLIC BLOOD PRESSURE: 129 MMHG | DIASTOLIC BLOOD PRESSURE: 77 MMHG | WEIGHT: 190 LBS | HEIGHT: 74 IN | TEMPERATURE: 97.9 F | OXYGEN SATURATION: 100 %

## 2021-07-08 DIAGNOSIS — Z78.9 OTHER SPECIFIED HEALTH STATUS: ICD-10-CM

## 2021-07-08 DIAGNOSIS — Z83.3 FAMILY HISTORY OF DIABETES MELLITUS: ICD-10-CM

## 2021-07-08 DIAGNOSIS — N50.811 RIGHT TESTICULAR PAIN: ICD-10-CM

## 2021-07-08 DIAGNOSIS — R35.1 NOCTURIA: ICD-10-CM

## 2021-07-08 DIAGNOSIS — R35.0 FREQUENCY OF MICTURITION: ICD-10-CM

## 2021-07-08 PROCEDURE — 51798 US URINE CAPACITY MEASURE: CPT

## 2021-07-08 PROCEDURE — 99204 OFFICE O/P NEW MOD 45 MIN: CPT | Mod: 25

## 2021-07-08 RX ORDER — VITAMIN A 10000 UNIT
TABLET ORAL
Refills: 0 | Status: ACTIVE | COMMUNITY

## 2021-07-08 RX ORDER — LUTEIN 6 MG
TABLET ORAL
Refills: 0 | Status: ACTIVE | COMMUNITY

## 2021-07-08 RX ORDER — CHROMIUM 200 MCG
TABLET ORAL
Refills: 0 | Status: ACTIVE | COMMUNITY

## 2021-07-08 NOTE — REVIEW OF SYSTEMS
[Feeling Tired] : feeling tired [Eyesight Problems] : eyesight problems [Abdominal Pain] : abdominal pain [Seen by urologist before (Name)  ___] : Preciously seen by a urologist: [unfilled] [History of kidney stones] : history of kidney stones [Wake up at night to urinate  How many times?  ___] : wakes up to urinate [unfilled] times during the night [Strong urge to urinate] : strong urge to urinate [Interrupted urine stream] : interrupted urine stream [Limb Swelling] : limb swelling [Dizziness] : dizziness [Difficulty Walking] : difficulty walking [Feelings Of Weakness] : feelings of weakness [see HPI] : see HPI [Negative] : Endocrine

## 2021-07-09 LAB
APPEARANCE: CLEAR
BACTERIA: NEGATIVE
BILIRUBIN URINE: NEGATIVE
BLOOD URINE: NEGATIVE
COLOR: NORMAL
GLUCOSE QUALITATIVE U: NEGATIVE
HYALINE CASTS: 0 /LPF
KETONES URINE: NEGATIVE
LEUKOCYTE ESTERASE URINE: NEGATIVE
MICROSCOPIC-UA: NORMAL
NITRITE URINE: NEGATIVE
PH URINE: 6
PROTEIN URINE: NEGATIVE
RED BLOOD CELLS URINE: 0 /HPF
SPECIFIC GRAVITY URINE: 1.01
SQUAMOUS EPITHELIAL CELLS: 0 /HPF
UROBILINOGEN URINE: NORMAL
WHITE BLOOD CELLS URINE: 0 /HPF

## 2021-07-10 LAB — BACTERIA UR CULT: NORMAL

## 2021-07-12 ENCOUNTER — APPOINTMENT (OUTPATIENT)
Dept: CT IMAGING | Facility: CLINIC | Age: 51
End: 2021-07-12

## 2021-07-12 NOTE — HISTORY OF PRESENT ILLNESS
[FreeTextEntry1] : 50 y/o male was referred by ER doctor for f/u right severe hydronephrosis, which has worsened since 2018. Apparently, patient was at ER for abdominal pain, which is midline to left of midline and similar to the 2 other times he had episodes of diverticulitis.  He is on abx but does not know which one.\par  CT scan showed sigmoid diverticulitis and a Horse shoe kidney with sever hydronephrosis on the right side, markedly progressed compared to CT scan in 2018. also CT scan showed a 5 mm right kidney stone unchanged from prior study.patient has no dysuria.\par \par c/o Frequent urination every 2-3 hrs and nocturia 2-3 times during sleep hrs. patient was seen by a Urologist about 18 months ago and was started on po Tamsulosin but was stopped because of orthostatic hypotension.\par \par right testicular discomfort with h/o abnormal scrotal ultrasound about 2 years ago \par

## 2021-07-12 NOTE — ASSESSMENT
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 will order U/A and Urine culture\par -scrotal exam showed a prominent right epididymis. Will obtain a scrotal ultrasound\par -CT urogram to better view excretory phase and ureteral insertion point. Or, r/o crossing vessel\par -f/u in 2 weeks\par \par We did go over his hx and imaging in detail and though I "young on his back" with his wife there, they did demonstrate understanding of what I showed them by reviewing chart.\par \par 52 min spent.\par \par WIll remind them of alpha blocker options as Tamsulosin made him dizzy in past.  IPSS 12\par WIll try Alfuzosin

## 2021-07-12 NOTE — LETTER BODY
[Dear  ___] : Dear  [unfilled], [Consult Letter:] : I had the pleasure of evaluating your patient, [unfilled]. [Please see my note below.] : Please see my note below. [Consult Closing:] : Thank you very much for allowing me to participate in the care of this patient.  If you have any questions, please do not hesitate to contact me. [FreeTextEntry1] : Please see my note. I will keep you informed. [FreeTextEntry3] : Sincerely,\par \par Kayley Cee MD\par Clinical \par [facility]\par [facility]\par  [DrJennifer  ___] : Dr. LEWIS

## 2021-07-12 NOTE — PHYSICAL EXAM
[Urethral Meatus] : meatus normal [Urinary Bladder Findings] : the bladder was normal on palpation [Scrotum] : the scrotum was normal [Testes Mass (___cm)] : there were no testicular masses [Prostate Enlargement] : the prostate was not enlarged [Prostate Tenderness] : the prostate was not tender [No Prostate Nodules] : no prostate nodules [Prostate Size ___ gm] : prostate size [unfilled] gm [FreeTextEntry1] : prominent right epididymis

## 2021-07-14 ENCOUNTER — APPOINTMENT (OUTPATIENT)
Dept: UROLOGY | Facility: CLINIC | Age: 51
End: 2021-07-14

## 2021-07-15 ENCOUNTER — APPOINTMENT (OUTPATIENT)
Dept: CT IMAGING | Facility: CLINIC | Age: 51
End: 2021-07-15
Payer: COMMERCIAL

## 2021-07-15 ENCOUNTER — OUTPATIENT (OUTPATIENT)
Dept: OUTPATIENT SERVICES | Facility: HOSPITAL | Age: 51
LOS: 1 days | End: 2021-07-15
Payer: COMMERCIAL

## 2021-07-15 DIAGNOSIS — N13.30 UNSPECIFIED HYDRONEPHROSIS: ICD-10-CM

## 2021-07-15 PROCEDURE — 74178 CT ABD&PLV WO CNTR FLWD CNTR: CPT | Mod: 26

## 2021-07-15 PROCEDURE — 74178 CT ABD&PLV WO CNTR FLWD CNTR: CPT

## 2021-07-20 DIAGNOSIS — N28.9 DISORDER OF KIDNEY AND URETER, UNSPECIFIED: ICD-10-CM

## 2021-07-20 DIAGNOSIS — K57.30 DIVERTICULOSIS OF LARGE INTESTINE WITHOUT PERFORATION OR ABSCESS WITHOUT BLEEDING: ICD-10-CM

## 2021-07-20 DIAGNOSIS — N13.30 UNSPECIFIED HYDRONEPHROSIS: ICD-10-CM

## 2021-07-20 DIAGNOSIS — N20.0 CALCULUS OF KIDNEY: ICD-10-CM

## 2021-07-22 ENCOUNTER — APPOINTMENT (OUTPATIENT)
Dept: UROLOGY | Facility: CLINIC | Age: 51
End: 2021-07-22
Payer: COMMERCIAL

## 2021-07-22 VITALS
HEART RATE: 78 BPM | OXYGEN SATURATION: 97 % | BODY MASS INDEX: 24.38 KG/M2 | SYSTOLIC BLOOD PRESSURE: 120 MMHG | DIASTOLIC BLOOD PRESSURE: 76 MMHG | WEIGHT: 190 LBS | TEMPERATURE: 98.5 F | HEIGHT: 74 IN

## 2021-07-22 DIAGNOSIS — R10.9 UNSPECIFIED ABDOMINAL PAIN: ICD-10-CM

## 2021-07-22 PROCEDURE — 99214 OFFICE O/P EST MOD 30 MIN: CPT

## 2021-07-22 NOTE — ASSESSMENT
[FreeTextEntry1] : We talked about the malpositioning of the ureteral insertion, altered rotation and lie of the kidney as well as the fact that the vasculature is very variable and can be unpredictable. I have suggested they see either my , Dr. Aguilera or Dr. Davin Sánchez for discussions regarding repair. In the past I have always done and did suggest a nuclear renal scan to assess his differential split between moieties. \par \par All Q's were answered and I will contact them once I hear back from partners.

## 2021-07-22 NOTE — HISTORY OF PRESENT ILLNESS
[FreeTextEntry1] : AUSTIN GREENWOOD is a 51 year old M who presents with horseshoe kidney and Rt upj obstuction/rt renal calculus. He continues to have right sided pain that waxes and wanes, though with a subtle constant awareness. There are no f/c at present and he denies n/v. He is blind and brought his wife w whom I reviewed his imaging study: in 3 dimensions. WIth CT review and diagrams she demonstrated understanding. The nephrogenic phase seems to show a nice rim of parenchymal tissue with some thinning from massive hydro seen on the non contrast portion.\par \par I see only one main bifurcating renal artery. We talked about crossing vessels vs upj intrinsic narrowing/scar, both congenital anomalies, as is his horseshoe, all of which have been present since birth. UPJ is seen in 15-30% of horseshoe patients. This urogram was done on 7/15/21 and his renal fxn remains relatively stable with Cr 1.05 and GFR 83 ml/min and his UA/Cx were both negative.

## 2021-07-22 NOTE — LETTER BODY
[Dear  ___] : Dear  [unfilled], [Consult Letter:] : I had the pleasure of evaluating your patient, [unfilled]. [Please see my note below.] : Please see my note below. [Consult Closing:] : Thank you very much for allowing me to participate in the care of this patient.  If you have any questions, please do not hesitate to contact me. [FreeTextEntry1] : Please see my note. [FreeTextEntry3] : Sincerely,\par \par Kayley Cee MD\par Clinical \par [facility]\par [facility]\par

## 2021-07-28 ENCOUNTER — TRANSCRIPTION ENCOUNTER (OUTPATIENT)
Age: 51
End: 2021-07-28

## 2021-08-04 ENCOUNTER — EMERGENCY (EMERGENCY)
Facility: HOSPITAL | Age: 51
LOS: 1 days | Discharge: HOME | End: 2021-08-04
Attending: STUDENT IN AN ORGANIZED HEALTH CARE EDUCATION/TRAINING PROGRAM | Admitting: STUDENT IN AN ORGANIZED HEALTH CARE EDUCATION/TRAINING PROGRAM
Payer: COMMERCIAL

## 2021-08-04 VITALS
HEART RATE: 88 BPM | OXYGEN SATURATION: 100 % | TEMPERATURE: 99 F | DIASTOLIC BLOOD PRESSURE: 75 MMHG | RESPIRATION RATE: 18 BRPM | SYSTOLIC BLOOD PRESSURE: 122 MMHG

## 2021-08-04 VITALS
DIASTOLIC BLOOD PRESSURE: 90 MMHG | OXYGEN SATURATION: 97 % | HEART RATE: 105 BPM | RESPIRATION RATE: 105 BRPM | SYSTOLIC BLOOD PRESSURE: 156 MMHG | TEMPERATURE: 101 F

## 2021-08-04 LAB
ALBUMIN SERPL ELPH-MCNC: 3.9 G/DL — SIGNIFICANT CHANGE UP (ref 3.3–5)
ALP SERPL-CCNC: 70 U/L — SIGNIFICANT CHANGE UP (ref 40–120)
ALT FLD-CCNC: 10 U/L — SIGNIFICANT CHANGE UP (ref 4–41)
ANION GAP SERPL CALC-SCNC: 15 MMOL/L — HIGH (ref 7–14)
AST SERPL-CCNC: 13 U/L — SIGNIFICANT CHANGE UP (ref 4–40)
BASE EXCESS BLDV CALC-SCNC: -2 MMOL/L — SIGNIFICANT CHANGE UP (ref -2–3)
BASOPHILS # BLD AUTO: 0.02 K/UL — SIGNIFICANT CHANGE UP (ref 0–0.2)
BASOPHILS NFR BLD AUTO: 0.2 % — SIGNIFICANT CHANGE UP (ref 0–2)
BILIRUB SERPL-MCNC: 0.7 MG/DL — SIGNIFICANT CHANGE UP (ref 0.2–1.2)
BLOOD GAS VENOUS COMPREHENSIVE RESULT: SIGNIFICANT CHANGE UP
BUN SERPL-MCNC: 13 MG/DL — SIGNIFICANT CHANGE UP (ref 7–23)
CALCIUM SERPL-MCNC: 9 MG/DL — SIGNIFICANT CHANGE UP (ref 8.4–10.5)
CHLORIDE BLDV-SCNC: 107 MMOL/L — SIGNIFICANT CHANGE UP (ref 96–108)
CHLORIDE SERPL-SCNC: 101 MMOL/L — SIGNIFICANT CHANGE UP (ref 98–107)
CO2 BLDV-SCNC: 24.3 MMOL/L — SIGNIFICANT CHANGE UP (ref 22–26)
CO2 SERPL-SCNC: 20 MMOL/L — LOW (ref 22–31)
CREAT SERPL-MCNC: 1.06 MG/DL — SIGNIFICANT CHANGE UP (ref 0.5–1.3)
EOSINOPHIL # BLD AUTO: 0.07 K/UL — SIGNIFICANT CHANGE UP (ref 0–0.5)
EOSINOPHIL NFR BLD AUTO: 0.9 % — SIGNIFICANT CHANGE UP (ref 0–6)
GAS PNL BLDV: 135 MMOL/L — LOW (ref 136–145)
GLUCOSE BLDV-MCNC: 95 MG/DL — SIGNIFICANT CHANGE UP (ref 70–99)
GLUCOSE SERPL-MCNC: 104 MG/DL — HIGH (ref 70–99)
HCO3 BLDV-SCNC: 23 MMOL/L — SIGNIFICANT CHANGE UP (ref 22–29)
HCT VFR BLD CALC: 38.6 % — LOW (ref 39–50)
HCT VFR BLDA CALC: 37 % — LOW (ref 39–51)
HGB BLD CALC-MCNC: 12.2 G/DL — LOW (ref 13–17)
HGB BLD-MCNC: 12.8 G/DL — LOW (ref 13–17)
IANC: 5.31 K/UL — SIGNIFICANT CHANGE UP (ref 1.5–8.5)
IMM GRANULOCYTES NFR BLD AUTO: 0.1 % — SIGNIFICANT CHANGE UP (ref 0–1.5)
LACTATE BLDV-MCNC: 1.3 MMOL/L — SIGNIFICANT CHANGE UP (ref 0.5–2)
LYMPHOCYTES # BLD AUTO: 1.89 K/UL — SIGNIFICANT CHANGE UP (ref 1–3.3)
LYMPHOCYTES # BLD AUTO: 23.4 % — SIGNIFICANT CHANGE UP (ref 13–44)
MCHC RBC-ENTMCNC: 28.6 PG — SIGNIFICANT CHANGE UP (ref 27–34)
MCHC RBC-ENTMCNC: 33.2 GM/DL — SIGNIFICANT CHANGE UP (ref 32–36)
MCV RBC AUTO: 86.4 FL — SIGNIFICANT CHANGE UP (ref 80–100)
MONOCYTES # BLD AUTO: 0.77 K/UL — SIGNIFICANT CHANGE UP (ref 0–0.9)
MONOCYTES NFR BLD AUTO: 9.5 % — SIGNIFICANT CHANGE UP (ref 2–14)
NEUTROPHILS # BLD AUTO: 5.31 K/UL — SIGNIFICANT CHANGE UP (ref 1.8–7.4)
NEUTROPHILS NFR BLD AUTO: 65.9 % — SIGNIFICANT CHANGE UP (ref 43–77)
NRBC # BLD: 0 /100 WBCS — SIGNIFICANT CHANGE UP
NRBC # FLD: 0 K/UL — SIGNIFICANT CHANGE UP
PCO2 BLDV: 40 MMHG — LOW (ref 42–55)
PH BLDV: 7.37 — SIGNIFICANT CHANGE UP (ref 7.32–7.43)
PLATELET # BLD AUTO: 133 K/UL — LOW (ref 150–400)
PO2 BLDV: 70 MMHG — SIGNIFICANT CHANGE UP
POTASSIUM BLDV-SCNC: 5.2 MMOL/L — HIGH (ref 3.5–5.1)
POTASSIUM SERPL-MCNC: 3.8 MMOL/L — SIGNIFICANT CHANGE UP (ref 3.5–5.3)
POTASSIUM SERPL-SCNC: 3.8 MMOL/L — SIGNIFICANT CHANGE UP (ref 3.5–5.3)
PROT SERPL-MCNC: 6.4 G/DL — SIGNIFICANT CHANGE UP (ref 6–8.3)
RBC # BLD: 4.47 M/UL — SIGNIFICANT CHANGE UP (ref 4.2–5.8)
RBC # FLD: 12.7 % — SIGNIFICANT CHANGE UP (ref 10.3–14.5)
SAO2 % BLDV: 94.9 % — SIGNIFICANT CHANGE UP
SODIUM SERPL-SCNC: 136 MMOL/L — SIGNIFICANT CHANGE UP (ref 135–145)
WBC # BLD: 8.07 K/UL — SIGNIFICANT CHANGE UP (ref 3.8–10.5)
WBC # FLD AUTO: 8.07 K/UL — SIGNIFICANT CHANGE UP (ref 3.8–10.5)

## 2021-08-04 PROCEDURE — 99285 EMERGENCY DEPT VISIT HI MDM: CPT

## 2021-08-04 RX ORDER — MORPHINE SULFATE 50 MG/1
4 CAPSULE, EXTENDED RELEASE ORAL ONCE
Refills: 0 | Status: DISCONTINUED | OUTPATIENT
Start: 2021-08-04 | End: 2021-08-04

## 2021-08-04 RX ORDER — SODIUM CHLORIDE 9 MG/ML
1000 INJECTION INTRAMUSCULAR; INTRAVENOUS; SUBCUTANEOUS ONCE
Refills: 0 | Status: COMPLETED | OUTPATIENT
Start: 2021-08-04 | End: 2021-08-04

## 2021-08-04 RX ADMIN — MORPHINE SULFATE 4 MILLIGRAM(S): 50 CAPSULE, EXTENDED RELEASE ORAL at 22:26

## 2021-08-04 RX ADMIN — SODIUM CHLORIDE 1000 MILLILITER(S): 9 INJECTION INTRAMUSCULAR; INTRAVENOUS; SUBCUTANEOUS at 22:27

## 2021-08-04 NOTE — ED PROVIDER NOTE - CLINICAL SUMMARY MEDICAL DECISION MAKING FREE TEXT BOX
the patient presented with symptoms typical of his recurrent diverticulitis; after extensive counseling, the patient decided against CT imaging given his history of frequent imaging. Previously he had been on augmentin, which he says does not work as well as cipro/flagyl for his diverticulitis. Furthermore, he also said having a fever is common for him during diverticulitis. Given lack of an elevated WBC, he was not septic. Furthermore, he verbalized understanding of a modified treatment algorithm and would return to the ED should symptoms worsen or no laxmi.

## 2021-08-04 NOTE — ED ADULT NURSE NOTE - CHIEF COMPLAINT QUOTE
Pt c/o of sharp mid lower abdominal pain and intermittent midsternal chest pain. States sx similar to diverticulitis. Denies n/v/d, fever.  Legally blind

## 2021-08-04 NOTE — ED PROVIDER NOTE - OBJECTIVE STATEMENT
Patient is  52 y/o M with PMHx of diverticulitis, Horshoe kidney, and R hydronephrosis 2/2 UPJ who presents with Patient is  50 y/o M with PMHx of diverticulitis, Horshoe kidney, and R hydronephrosis 2/2 UPJ who presents with  abdominal pain. The pain feel similar to his previous bouts of colitis/diverticulitis. Furthermore, the patient has been scanned numerous times for the same complaint. Finally, the patient states that he symptomatically improves with cipro/flagyl vs augmentin.

## 2021-08-04 NOTE — ED PROVIDER NOTE - NSFOLLOWUPINSTRUCTIONS_ED_ALL_ED_FT
- Take antibiotic as prescribed   - Please follow up with your Gastroenterologist for management of Diverticulitis   - Please return if abdominal pain/symptoms worsen

## 2021-08-04 NOTE — ED ADULT TRIAGE NOTE - CHIEF COMPLAINT QUOTE
Pt c/o of sharp mid lower abdominal pain. States sx similar to diverticulitis. Denies n/v/d, fever. Pt c/o of sharp mid lower abdominal pain and intermittent midsternal chest pain. States sx similar to diverticulitis. Denies n/v/d, fever. Pt c/o of sharp mid lower abdominal pain and intermittent midsternal chest pain. States sx similar to diverticulitis. Denies n/v/d, fever.  Legally blind

## 2021-08-04 NOTE — ED ADULT TRIAGE NOTE - DOMESTIC TRAVEL HIGH RISK QUESTION
POST-OPERATIVE NOTE    PROCEDURE: Open Ileostomy Reversal and Intraoperative TAP Block    INTERVAL HPI: Pain well controlled. Tolerating PO intake. Voiding. -Flatus, -BM. Has been OOB to bathroom.  Denies N/V/CP/SOB.     Vital Signs Last 24 Hrs  T(C): 36.8 (31 Dec 2018 12:57), Max: 37.1 (31 Dec 2018 06:06)  T(F): 98.2 (31 Dec 2018 12:57), Max: 98.8 (31 Dec 2018 06:06)  HR: 86 (31 Dec 2018 12:57) (80 - 88)  BP: 125/92 (31 Dec 2018 12:57) (110/72 - 131/94)  BP(mean): --  RR: 20 (31 Dec 2018 12:57) (12 - 20)  SpO2: 98% (31 Dec 2018 12:57) (95% - 98%)    PE  Gen: AAO x3, NAD  Pulm: no increased WOB  Abd: Soft, appropriately tender, ostomy dressing c/d/i  Ext: No C/C/E  Neuro: No focal neurological deficits    56 year old male with history of diverticulitis and colostomy which was placed in June 2018, s/p scheduled Healy reversal and diverting ileostomy on 10/24, now post-op s/p open ileostomy reversal, doing well    Plan:  -NPO until bowel fcn returns  -daily dressing changes  -DVT ppx No

## 2021-08-04 NOTE — ED PROVIDER NOTE - ATTENDING CONTRIBUTION TO CARE
I (Arely) agree with above, I performed a history and physical. Counseled jose medical staff on medical decision making as documented, additionally and/or with the following exceptions: see my mdm

## 2021-08-04 NOTE — ED PROVIDER NOTE - PATIENT PORTAL LINK FT
You can access the FollowMyHealth Patient Portal offered by [facility] by registering at the following website: [email or web address]. By joining [facility]’s FollowMyHealth portal, you will also be able to view your health information using other applications (apps) compatible with our system.

## 2021-08-04 NOTE — ED ADULT NURSE NOTE - OBJECTIVE STATEMENT
52yo male received in room 22. pt A&Ox3. legally blind, hx of diverticulitis, c/o abdominal pain for the past weeks, states symptoms is similar to his past diverticulitis. Abdomen soft, non-distended, tender to palpate. . Denies nauseas, vomiting, and diarrhea at present. Denies hemauria and dysuria. febrile at triage but not fever at present. respiration even and non-labored. in nad. 20G iv placed in rac, lab drawn and sent. pt medicated as per order. Side rails up, bed at lowest position, call bell within reach, patient oriented to the unit, safety maintained.

## 2021-08-04 NOTE — ED PROVIDER NOTE - PHYSICAL EXAMINATION
CONSTITUTIONAL: Non-toxic, non-diaphoretic, in no apparent distress  HEAD: Normocephalic; atruamatic  EYES: EOM intact   ENMT: External appears normal; normal oropharynx, moist  NECK: grossly normal active ROM,  CARD: No cyanosis, good peripheral perfusion, RRR  RESP: Normal chest excursion with respiration; no increased work of breathing  ABD: non-distended , soft +++ttp llq, minimal  EXT: moving all extremities, no gross disfigurement or asymmetry,  SKIN: Warm, dry, no rash  NEURO:  moving all extremities, no facial droop, no dysarthria      cn2-12 intact  5/5 all extremities

## 2021-08-05 RX ORDER — CIPROFLOXACIN LACTATE 400MG/40ML
500 VIAL (ML) INTRAVENOUS ONCE
Refills: 0 | Status: COMPLETED | OUTPATIENT
Start: 2021-08-05 | End: 2021-08-05

## 2021-08-05 RX ORDER — METRONIDAZOLE 500 MG
500 TABLET ORAL ONCE
Refills: 0 | Status: COMPLETED | OUTPATIENT
Start: 2021-08-05 | End: 2021-08-05

## 2021-08-05 RX ADMIN — Medication 500 MILLIGRAM(S): at 00:16

## 2021-08-06 ENCOUNTER — TRANSCRIPTION ENCOUNTER (OUTPATIENT)
Age: 51
End: 2021-08-06

## 2021-08-12 ENCOUNTER — TRANSCRIPTION ENCOUNTER (OUTPATIENT)
Age: 51
End: 2021-08-12

## 2021-08-16 ENCOUNTER — APPOINTMENT (OUTPATIENT)
Dept: UROLOGY | Facility: CLINIC | Age: 51
End: 2021-08-16
Payer: COMMERCIAL

## 2021-08-16 VITALS
SYSTOLIC BLOOD PRESSURE: 121 MMHG | RESPIRATION RATE: 16 BRPM | TEMPERATURE: 98 F | HEART RATE: 80 BPM | DIASTOLIC BLOOD PRESSURE: 78 MMHG | BODY MASS INDEX: 24.38 KG/M2 | WEIGHT: 190 LBS | HEIGHT: 74 IN

## 2021-08-16 PROCEDURE — 99213 OFFICE O/P EST LOW 20 MIN: CPT

## 2021-08-18 NOTE — HISTORY OF PRESENT ILLNESS
[FreeTextEntry1] : 52yo gentleman with cc of R hydro and horseshoe kidney. Pt was seen by Dr Cee earlier this year after ER episode for abd pain. He has known horseshoe kidney with R hydro that was worsened as compared to 2018. He has issues with recurrent diverticulitis and this is how hydro was noted. No prior hx of stones. No episodes of pyelo. No issues with flank pain/Dietl's crisis. \par \par Reviewed CT that showed horseshoe kidney with severe R hydro and cortical thinning. Has 7mm RLP stone.

## 2021-08-18 NOTE — PHYSICAL EXAM
[General Appearance - Well Developed] : well developed [Abdomen Soft] : soft [Abdomen Tenderness] : non-tender [Costovertebral Angle Tenderness] : no ~M costovertebral angle tenderness [Exaggerated Use Of Accessory Muscles For Inspiration] : no accessory muscle use [Oriented To Time, Place, And Person] : oriented to person, place, and time [Normal Station and Gait] : the gait and station were normal for the patient's age

## 2021-08-18 NOTE — ASSESSMENT
[FreeTextEntry1] : Horshoe kidney with worsening R hydro without clear sequelae. Appears to have some loss of parenchyma on imaging but will eval formally with lasix renal scan. \par --Lasix renal scan\par --RTC after above

## 2021-08-31 ENCOUNTER — APPOINTMENT (OUTPATIENT)
Dept: NUCLEAR MEDICINE | Facility: HOSPITAL | Age: 51
End: 2021-08-31
Payer: COMMERCIAL

## 2021-08-31 ENCOUNTER — OUTPATIENT (OUTPATIENT)
Dept: OUTPATIENT SERVICES | Facility: HOSPITAL | Age: 51
LOS: 1 days | End: 2021-08-31

## 2021-08-31 DIAGNOSIS — N13.30 UNSPECIFIED HYDRONEPHROSIS: ICD-10-CM

## 2021-08-31 PROCEDURE — 51702 INSERT TEMP BLADDER CATH: CPT

## 2021-08-31 PROCEDURE — 78708 K FLOW/FUNCT IMAGE W/DRUG: CPT | Mod: 26

## 2021-09-02 ENCOUNTER — APPOINTMENT (OUTPATIENT)
Dept: UROLOGY | Facility: CLINIC | Age: 51
End: 2021-09-02
Payer: COMMERCIAL

## 2021-09-02 VITALS — TEMPERATURE: 98 F

## 2021-09-02 PROCEDURE — 99214 OFFICE O/P EST MOD 30 MIN: CPT

## 2021-09-02 NOTE — ASSESSMENT
[FreeTextEntry1] : Horseshoe kidney with worsening R hydro without clear sequelae. Appears to have some loss of parenchyma on imaging but no significant loss of function by renal scan. Scan shows no drainage from R even after diuretic admin c/w obstruction. Discussed management with dismembered pyeloplasty via laparoscopic/robotic approach. Discussed risks of surgery including but not limited to bleeding, infection, injury to intra-abdominal contents, urine leak, and conversion to open. Discussed longer term risk of re-stricture. Pt wishes to proceed with surgery\par --Medical clearance\par --Schedule for cysto, stent and RAL R pyeloplasty with attempted stone extraction. \par

## 2021-09-02 NOTE — HISTORY OF PRESENT ILLNESS
[FreeTextEntry1] : 52yo gentleman with cc of R hydro and horseshoe kidney. Pt was seen by Dr Cee earlier this year after ER episode for abd pain. He has known horseshoe kidney with R hydro that was worsened as compared to 2018. He has issues with recurrent diverticulitis and this is how hydro was noted. No prior hx of stones. No episodes of pyelo. No issues with flank pain/Dietl's crisis. \par \par Reviewed CT that showed horseshoe kidney with severe R hydro and cortical thinning. Has 7mm RLP stone. Plan made for lasix renal scan for eval. This showed 44% function on the R vs 56% L (with some function in isthmus). The L side drains appropriately with T1/2 of 4min. The R showed no drainage even after diuretic challenge. \par \par No prior abd surgery. Pt is seeing Dr Cox for eval of recurrent diverticulitis

## 2021-09-28 ENCOUNTER — OUTPATIENT (OUTPATIENT)
Dept: OUTPATIENT SERVICES | Facility: HOSPITAL | Age: 51
LOS: 1 days | End: 2021-09-28

## 2021-09-28 VITALS
DIASTOLIC BLOOD PRESSURE: 90 MMHG | HEIGHT: 73 IN | TEMPERATURE: 98 F | RESPIRATION RATE: 16 BRPM | OXYGEN SATURATION: 98 % | WEIGHT: 186.95 LBS | HEART RATE: 90 BPM | SYSTOLIC BLOOD PRESSURE: 140 MMHG

## 2021-09-28 DIAGNOSIS — Z98.890 OTHER SPECIFIED POSTPROCEDURAL STATES: Chronic | ICD-10-CM

## 2021-09-28 DIAGNOSIS — S68.119A COMPLETE TRAUMATIC METACARPOPHALANGEAL AMPUTATION OF UNSPECIFIED FINGER, INITIAL ENCOUNTER: Chronic | ICD-10-CM

## 2021-09-28 DIAGNOSIS — Q63.1 LOBULATED, FUSED AND HORSESHOE KIDNEY: ICD-10-CM

## 2021-09-28 DIAGNOSIS — Z98.49 CATARACT EXTRACTION STATUS, UNSPECIFIED EYE: Chronic | ICD-10-CM

## 2021-09-28 LAB
ANION GAP SERPL CALC-SCNC: 16 MMOL/L — HIGH (ref 7–14)
BLD GP AB SCN SERPL QL: NEGATIVE — SIGNIFICANT CHANGE UP
BUN SERPL-MCNC: 14 MG/DL — SIGNIFICANT CHANGE UP (ref 7–23)
CALCIUM SERPL-MCNC: 9.7 MG/DL — SIGNIFICANT CHANGE UP (ref 8.4–10.5)
CHLORIDE SERPL-SCNC: 105 MMOL/L — SIGNIFICANT CHANGE UP (ref 98–107)
CO2 SERPL-SCNC: 22 MMOL/L — SIGNIFICANT CHANGE UP (ref 22–31)
CREAT SERPL-MCNC: 1.04 MG/DL — SIGNIFICANT CHANGE UP (ref 0.5–1.3)
GLUCOSE SERPL-MCNC: 75 MG/DL — SIGNIFICANT CHANGE UP (ref 70–99)
HCT VFR BLD CALC: 40.9 % — SIGNIFICANT CHANGE UP (ref 39–50)
HGB BLD-MCNC: 14.2 G/DL — SIGNIFICANT CHANGE UP (ref 13–17)
MCHC RBC-ENTMCNC: 29.7 PG — SIGNIFICANT CHANGE UP (ref 27–34)
MCHC RBC-ENTMCNC: 34.7 GM/DL — SIGNIFICANT CHANGE UP (ref 32–36)
MCV RBC AUTO: 85.6 FL — SIGNIFICANT CHANGE UP (ref 80–100)
NRBC # BLD: 0 /100 WBCS — SIGNIFICANT CHANGE UP
NRBC # FLD: 0 K/UL — SIGNIFICANT CHANGE UP
PLATELET # BLD AUTO: 185 K/UL — SIGNIFICANT CHANGE UP (ref 150–400)
POTASSIUM SERPL-MCNC: 4.4 MMOL/L — SIGNIFICANT CHANGE UP (ref 3.5–5.3)
POTASSIUM SERPL-SCNC: 4.4 MMOL/L — SIGNIFICANT CHANGE UP (ref 3.5–5.3)
RBC # BLD: 4.78 M/UL — SIGNIFICANT CHANGE UP (ref 4.2–5.8)
RBC # FLD: 12.9 % — SIGNIFICANT CHANGE UP (ref 10.3–14.5)
RH IG SCN BLD-IMP: POSITIVE — SIGNIFICANT CHANGE UP
SODIUM SERPL-SCNC: 143 MMOL/L — SIGNIFICANT CHANGE UP (ref 135–145)
WBC # BLD: 5.62 K/UL — SIGNIFICANT CHANGE UP (ref 3.8–10.5)
WBC # FLD AUTO: 5.62 K/UL — SIGNIFICANT CHANGE UP (ref 3.8–10.5)

## 2021-09-28 NOTE — H&P PST ADULT - HISTORY OF PRESENT ILLNESS
51 year old male presurgical testing with diagnosis of lobulated fused and horseshoe kidney, unspecified abdominal pain, other obstructive defects of renal pelvis and ureter scheduled for robotic assisted laparoscopic right pyeloplasty with kidney stone removal cystoscopy right ureteral stent placement. Pt with a right hydronephrosis and horseshoe kidney. Pt was evaluated in the ED for abdominal pain, treated with antibiotics for diverticulitis. Pt complaining of mild right abdominal discomfort.  51 year old male presurgical testing with diagnosis of lobulated fused and horseshoe kidney, unspecified abdominal pain, other obstructive defects of renal pelvis and ureter scheduled for robotic assisted laparoscopic right pyeloplasty with kidney stone removal cystoscopy right ureteral stent placement. Pt with a right hydronephrosis and horseshoe kidney. Pt was evaluated in the ED for abdominal pain in 8/2021, treated with antibiotics for diverticulitis. Pt complaining of mild right abdominal discomfort.

## 2021-09-28 NOTE — H&P PST ADULT - NSICDXFAMILYHX_GEN_ALL_CORE_FT
FAMILY HISTORY:  Father  Still living? Yes, Estimated age: Age Unknown  FH: type 2 diabetes, Age at diagnosis: Age Unknown    Mother  Still living? Unknown  Family history of rectal cancer, Age at diagnosis: Age Unknown

## 2021-09-28 NOTE — H&P PST ADULT - NSICDXPASTSURGICALHX_GEN_ALL_CORE_FT
PAST SURGICAL HISTORY:  Amputation of digit of right hand Right fifth digit reattachment    S/P cataract surgery b/l    S/P correction of deviated nasal septum x2    S/P ORIF (open reduction internal fixation) fracture left arm

## 2021-09-28 NOTE — H&P PST ADULT - NSICDXPASTMEDICALHX_GEN_ALL_CORE_FT
PAST MEDICAL HISTORY:  Blindness     Cluster headache     Congenital hydronephrosis Right    History of diverticulitis     Horseshoe kidney Right    Left leg swelling chronic

## 2021-09-28 NOTE — H&P PST ADULT - REASON FOR ADMISSION
lobulated fused and horseshoe kidney  unspecified abdominal pain  other obstructive defects of renal pelvis and ureter

## 2021-09-28 NOTE — H&P PST ADULT - NSANTHOSAYNRD_GEN_A_CORE
No. TOAN screening performed.  STOP BANG Legend: 0-2 = LOW Risk; 3-4 = INTERMEDIATE Risk; 5-8 = HIGH Risk

## 2021-09-30 LAB
CULTURE RESULTS: NO GROWTH — SIGNIFICANT CHANGE UP
SPECIMEN SOURCE: SIGNIFICANT CHANGE UP

## 2021-10-03 ENCOUNTER — APPOINTMENT (OUTPATIENT)
Dept: DISASTER EMERGENCY | Facility: CLINIC | Age: 51
End: 2021-10-03

## 2021-10-03 DIAGNOSIS — Z01.818 ENCOUNTER FOR OTHER PREPROCEDURAL EXAMINATION: ICD-10-CM

## 2021-10-03 PROBLEM — M79.89 OTHER SPECIFIED SOFT TISSUE DISORDERS: Chronic | Status: ACTIVE | Noted: 2021-09-28

## 2021-10-03 PROBLEM — Q63.1 LOBULATED, FUSED AND HORSESHOE KIDNEY: Chronic | Status: ACTIVE | Noted: 2021-09-28

## 2021-10-03 PROBLEM — Q62.0 CONGENITAL HYDRONEPHROSIS: Chronic | Status: ACTIVE | Noted: 2021-09-28

## 2021-10-03 PROBLEM — Z87.19 PERSONAL HISTORY OF OTHER DISEASES OF THE DIGESTIVE SYSTEM: Chronic | Status: ACTIVE | Noted: 2021-09-28

## 2021-10-04 LAB — SARS-COV-2 N GENE NPH QL NAA+PROBE: NOT DETECTED

## 2021-10-05 ENCOUNTER — TRANSCRIPTION ENCOUNTER (OUTPATIENT)
Age: 51
End: 2021-10-05

## 2021-10-06 ENCOUNTER — APPOINTMENT (OUTPATIENT)
Dept: UROLOGY | Facility: HOSPITAL | Age: 51
End: 2021-10-06

## 2021-10-06 ENCOUNTER — INPATIENT (INPATIENT)
Facility: HOSPITAL | Age: 51
LOS: 0 days | Discharge: HOME | End: 2021-10-07
Attending: UROLOGY | Admitting: UROLOGY
Payer: COMMERCIAL

## 2021-10-06 VITALS
HEART RATE: 96 BPM | HEIGHT: 73 IN | DIASTOLIC BLOOD PRESSURE: 76 MMHG | WEIGHT: 186.95 LBS | TEMPERATURE: 98 F | SYSTOLIC BLOOD PRESSURE: 137 MMHG | OXYGEN SATURATION: 98 % | RESPIRATION RATE: 14 BRPM

## 2021-10-06 DIAGNOSIS — Z98.890 OTHER SPECIFIED POSTPROCEDURAL STATES: Chronic | ICD-10-CM

## 2021-10-06 DIAGNOSIS — Z98.49 CATARACT EXTRACTION STATUS, UNSPECIFIED EYE: Chronic | ICD-10-CM

## 2021-10-06 DIAGNOSIS — R10.9 UNSPECIFIED ABDOMINAL PAIN: ICD-10-CM

## 2021-10-06 DIAGNOSIS — S68.119A COMPLETE TRAUMATIC METACARPOPHALANGEAL AMPUTATION OF UNSPECIFIED FINGER, INITIAL ENCOUNTER: Chronic | ICD-10-CM

## 2021-10-06 PROCEDURE — 50544 LAPAROSCOPY PYELOPLASTY: CPT | Mod: RT

## 2021-10-06 RX ORDER — FENTANYL CITRATE 50 UG/ML
25 INJECTION INTRAVENOUS
Refills: 0 | Status: DISCONTINUED | OUTPATIENT
Start: 2021-10-06 | End: 2021-10-06

## 2021-10-06 RX ORDER — HEPARIN SODIUM 5000 [USP'U]/ML
5000 INJECTION INTRAVENOUS; SUBCUTANEOUS EVERY 8 HOURS
Refills: 0 | Status: DISCONTINUED | OUTPATIENT
Start: 2021-10-06 | End: 2021-10-07

## 2021-10-06 RX ORDER — CEFAZOLIN SODIUM 1 G
1000 VIAL (EA) INJECTION EVERY 8 HOURS
Refills: 0 | Status: COMPLETED | OUTPATIENT
Start: 2021-10-06 | End: 2021-10-07

## 2021-10-06 RX ORDER — ERGOCALCIFEROL 1.25 MG/1
1 CAPSULE ORAL
Qty: 0 | Refills: 0 | DISCHARGE

## 2021-10-06 RX ORDER — SODIUM CHLORIDE 9 MG/ML
1000 INJECTION, SOLUTION INTRAVENOUS
Refills: 0 | Status: DISCONTINUED | OUTPATIENT
Start: 2021-10-06 | End: 2021-10-07

## 2021-10-06 RX ORDER — SIMETHICONE 80 MG/1
80 TABLET, CHEWABLE ORAL ONCE
Refills: 0 | Status: COMPLETED | OUTPATIENT
Start: 2021-10-06 | End: 2021-10-06

## 2021-10-06 RX ORDER — ONDANSETRON 8 MG/1
4 TABLET, FILM COATED ORAL ONCE
Refills: 0 | Status: DISCONTINUED | OUTPATIENT
Start: 2021-10-06 | End: 2021-10-06

## 2021-10-06 RX ORDER — KETOROLAC TROMETHAMINE 30 MG/ML
15 SYRINGE (ML) INJECTION EVERY 6 HOURS
Refills: 0 | Status: DISCONTINUED | OUTPATIENT
Start: 2021-10-06 | End: 2021-10-07

## 2021-10-06 RX ORDER — SENNA PLUS 8.6 MG/1
2 TABLET ORAL AT BEDTIME
Refills: 0 | Status: DISCONTINUED | OUTPATIENT
Start: 2021-10-06 | End: 2021-10-07

## 2021-10-06 RX ORDER — ACETAMINOPHEN 500 MG
975 TABLET ORAL EVERY 6 HOURS
Refills: 0 | Status: DISCONTINUED | OUTPATIENT
Start: 2021-10-06 | End: 2021-10-07

## 2021-10-06 RX ORDER — ONDANSETRON 8 MG/1
4 TABLET, FILM COATED ORAL EVERY 6 HOURS
Refills: 0 | Status: DISCONTINUED | OUTPATIENT
Start: 2021-10-06 | End: 2021-10-07

## 2021-10-06 RX ORDER — HYDROMORPHONE HYDROCHLORIDE 2 MG/ML
0.5 INJECTION INTRAMUSCULAR; INTRAVENOUS; SUBCUTANEOUS
Refills: 0 | Status: DISCONTINUED | OUTPATIENT
Start: 2021-10-06 | End: 2021-10-06

## 2021-10-06 RX ADMIN — SIMETHICONE 80 MILLIGRAM(S): 80 TABLET, CHEWABLE ORAL at 20:55

## 2021-10-06 RX ADMIN — HEPARIN SODIUM 5000 UNIT(S): 5000 INJECTION INTRAVENOUS; SUBCUTANEOUS at 23:13

## 2021-10-06 RX ADMIN — Medication 100 MILLIGRAM(S): at 23:11

## 2021-10-06 RX ADMIN — Medication 15 MILLIGRAM(S): at 23:12

## 2021-10-06 RX ADMIN — Medication 975 MILLIGRAM(S): at 23:12

## 2021-10-06 NOTE — BRIEF OPERATIVE NOTE - OPERATION/FINDINGS
Right UPJ obstruction in horseshoe kidney.  R kidney medialized.  Cross large vein compressing the proximal ureter/UPJ

## 2021-10-06 NOTE — PROGRESS NOTE ADULT - ASSESSMENT
51 year old male s/p Robotic-assisted right pyeloplasty, stable.     -Strict I&O's  -Analgesia prn  -Antiemetics prn  -Ancef  -DVT prophylaxis  -Incentive spirometry  -Clears   -OOB  -AM labs

## 2021-10-07 ENCOUNTER — TRANSCRIPTION ENCOUNTER (OUTPATIENT)
Age: 51
End: 2021-10-07

## 2021-10-07 VITALS
OXYGEN SATURATION: 98 % | HEART RATE: 80 BPM | DIASTOLIC BLOOD PRESSURE: 61 MMHG | TEMPERATURE: 98 F | SYSTOLIC BLOOD PRESSURE: 101 MMHG | RESPIRATION RATE: 16 BRPM

## 2021-10-07 DIAGNOSIS — R20.0 ANESTHESIA OF SKIN: ICD-10-CM

## 2021-10-07 DIAGNOSIS — N13.5 CROSSING VESSEL AND STRICTURE OF URETER WITHOUT HYDRONEPHROSIS: ICD-10-CM

## 2021-10-07 DIAGNOSIS — Q63.1 LOBULATED, FUSED AND HORSESHOE KIDNEY: ICD-10-CM

## 2021-10-07 DIAGNOSIS — Z87.19 PERSONAL HISTORY OF OTHER DISEASES OF THE DIGESTIVE SYSTEM: ICD-10-CM

## 2021-10-07 LAB
ANION GAP SERPL CALC-SCNC: 15 MMOL/L — HIGH (ref 7–14)
BASOPHILS # BLD AUTO: 0 K/UL — SIGNIFICANT CHANGE UP (ref 0–0.2)
BASOPHILS NFR BLD AUTO: 0 % — SIGNIFICANT CHANGE UP (ref 0–2)
BUN SERPL-MCNC: 19 MG/DL — SIGNIFICANT CHANGE UP (ref 7–23)
CALCIUM SERPL-MCNC: 9.1 MG/DL — SIGNIFICANT CHANGE UP (ref 8.4–10.5)
CHLORIDE SERPL-SCNC: 103 MMOL/L — SIGNIFICANT CHANGE UP (ref 98–107)
CO2 SERPL-SCNC: 21 MMOL/L — LOW (ref 22–31)
COVID-19 SPIKE DOMAIN AB INTERP: POSITIVE
COVID-19 SPIKE DOMAIN ANTIBODY RESULT: >250 U/ML — HIGH
CREAT FLD-MCNC: 4.7 MG/DL — SIGNIFICANT CHANGE UP
CREAT SERPL-MCNC: 1.2 MG/DL — SIGNIFICANT CHANGE UP (ref 0.5–1.3)
EOSINOPHIL # BLD AUTO: 0 K/UL — SIGNIFICANT CHANGE UP (ref 0–0.5)
EOSINOPHIL NFR BLD AUTO: 0 % — SIGNIFICANT CHANGE UP (ref 0–6)
GLUCOSE SERPL-MCNC: 130 MG/DL — HIGH (ref 70–99)
HCT VFR BLD CALC: 37.8 % — LOW (ref 39–50)
HGB BLD-MCNC: 13.1 G/DL — SIGNIFICANT CHANGE UP (ref 13–17)
IANC: 6.75 K/UL — SIGNIFICANT CHANGE UP (ref 1.5–8.5)
IMM GRANULOCYTES NFR BLD AUTO: 0.4 % — SIGNIFICANT CHANGE UP (ref 0–1.5)
LYMPHOCYTES # BLD AUTO: 0.99 K/UL — LOW (ref 1–3.3)
LYMPHOCYTES # BLD AUTO: 12.1 % — LOW (ref 13–44)
MCHC RBC-ENTMCNC: 29.3 PG — SIGNIFICANT CHANGE UP (ref 27–34)
MCHC RBC-ENTMCNC: 34.7 GM/DL — SIGNIFICANT CHANGE UP (ref 32–36)
MCV RBC AUTO: 84.6 FL — SIGNIFICANT CHANGE UP (ref 80–100)
MONOCYTES # BLD AUTO: 0.4 K/UL — SIGNIFICANT CHANGE UP (ref 0–0.9)
MONOCYTES NFR BLD AUTO: 4.9 % — SIGNIFICANT CHANGE UP (ref 2–14)
NEUTROPHILS # BLD AUTO: 6.75 K/UL — SIGNIFICANT CHANGE UP (ref 1.8–7.4)
NEUTROPHILS NFR BLD AUTO: 82.6 % — HIGH (ref 43–77)
NRBC # BLD: 0 /100 WBCS — SIGNIFICANT CHANGE UP
NRBC # FLD: 0 K/UL — SIGNIFICANT CHANGE UP
PLATELET # BLD AUTO: 177 K/UL — SIGNIFICANT CHANGE UP (ref 150–400)
POTASSIUM SERPL-MCNC: 4.3 MMOL/L — SIGNIFICANT CHANGE UP (ref 3.5–5.3)
POTASSIUM SERPL-SCNC: 4.3 MMOL/L — SIGNIFICANT CHANGE UP (ref 3.5–5.3)
RBC # BLD: 4.47 M/UL — SIGNIFICANT CHANGE UP (ref 4.2–5.8)
RBC # FLD: 12.8 % — SIGNIFICANT CHANGE UP (ref 10.3–14.5)
SARS-COV-2 IGG+IGM SERPL QL IA: >250 U/ML — HIGH
SARS-COV-2 IGG+IGM SERPL QL IA: POSITIVE
SODIUM SERPL-SCNC: 139 MMOL/L — SIGNIFICANT CHANGE UP (ref 135–145)
WBC # BLD: 8.17 K/UL — SIGNIFICANT CHANGE UP (ref 3.8–10.5)
WBC # FLD AUTO: 8.17 K/UL — SIGNIFICANT CHANGE UP (ref 3.8–10.5)

## 2021-10-07 PROCEDURE — 99222 1ST HOSP IP/OBS MODERATE 55: CPT

## 2021-10-07 RX ORDER — ACETAMINOPHEN 500 MG
3 TABLET ORAL
Qty: 0 | Refills: 0 | DISCHARGE
Start: 2021-10-07

## 2021-10-07 RX ORDER — PHENAZOPYRIDINE HCL 100 MG
1 TABLET ORAL
Qty: 6 | Refills: 0
Start: 2021-10-07 | End: 2021-10-08

## 2021-10-07 RX ORDER — POLYETHYLENE GLYCOL 3350 17 G/17G
1 POWDER, FOR SOLUTION ORAL
Qty: 0 | Refills: 0 | DISCHARGE

## 2021-10-07 RX ORDER — INFLUENZA VIRUS VACCINE 15; 15; 15; 15 UG/.5ML; UG/.5ML; UG/.5ML; UG/.5ML
0.5 SUSPENSION INTRAMUSCULAR ONCE
Refills: 0 | Status: COMPLETED | OUTPATIENT
Start: 2021-10-07 | End: 2021-10-07

## 2021-10-07 RX ORDER — IBUPROFEN 200 MG
1 TABLET ORAL
Qty: 20 | Refills: 0
Start: 2021-10-07

## 2021-10-07 RX ORDER — SODIUM CHLORIDE 9 MG/ML
1000 INJECTION, SOLUTION INTRAVENOUS
Refills: 0 | Status: DISCONTINUED | OUTPATIENT
Start: 2021-10-07 | End: 2021-10-07

## 2021-10-07 RX ORDER — LUTEIN 20 MG
1 CAPSULE ORAL
Qty: 0 | Refills: 0 | DISCHARGE

## 2021-10-07 RX ORDER — OMEGA-3 ACID ETHYL ESTERS 1 G
1 CAPSULE ORAL
Qty: 0 | Refills: 0 | DISCHARGE

## 2021-10-07 RX ORDER — SENNA PLUS 8.6 MG/1
2 TABLET ORAL
Qty: 0 | Refills: 0 | DISCHARGE
Start: 2021-10-07

## 2021-10-07 RX ORDER — PHENAZOPYRIDINE HCL 100 MG
200 TABLET ORAL THREE TIMES A DAY
Refills: 0 | Status: DISCONTINUED | OUTPATIENT
Start: 2021-10-07 | End: 2021-10-07

## 2021-10-07 RX ADMIN — Medication 100 MILLIGRAM(S): at 05:39

## 2021-10-07 RX ADMIN — Medication 15 MILLIGRAM(S): at 05:39

## 2021-10-07 RX ADMIN — Medication 975 MILLIGRAM(S): at 05:39

## 2021-10-07 RX ADMIN — HEPARIN SODIUM 5000 UNIT(S): 5000 INJECTION INTRAVENOUS; SUBCUTANEOUS at 05:41

## 2021-10-07 NOTE — DISCHARGE NOTE PROVIDER - NSDCMRMEDTOKEN_GEN_ALL_CORE_FT
acetaminophen 325 mg oral tablet: 3 tablets every 6 hours as needed for mild to moderate pain  ibuprofen 600 mg oral tablet: 1 tablet every 6 hours as needed for severe pain, take with food  MiraLax oral powder for reconstitution: 1 packet(s) orally once a day as needed for constipation  phenazopyridine 200 mg oral tablet: 1 tab(s) orally 3 times a day, As needed, for stent irritation  senna oral tablet: 2 tab(s) orally once a day (at bedtime), As needed, Constipation  vitamin d: 1 tab(s) orally once a day

## 2021-10-07 NOTE — CONSULT NOTE ADULT - PROBLEM SELECTOR RECOMMENDATION 9
-developed right hand numbness from 2nd to 4th fingers , h/o hand surgery to reattached an amputated 5th finger (sensation in 5th finger is intact)  -pt denies h/o carpal tunnel syndrome or prior known neuropathy  -this could be related to positioning during robotic surgery, and should improve over time  -pt advised to f/u with his surgeon

## 2021-10-07 NOTE — CONSULT NOTE ADULT - PROBLEM SELECTOR RECOMMENDATION 3
H/o multiple bouts of diverticulitis, most recently in June '21, treated with abx  -pt reports he consulted a colorectal surgeon and was recommended to have surgery in the near future, outpt f/u CRS

## 2021-10-07 NOTE — CONSULT NOTE ADULT - PROBLEM SELECTOR RECOMMENDATION 2
-h/o chronic right UPJ obstruction in horseshoe kidney with severe right hydronephrosis, right kidney stone  -s/p right pyeloplasty as above  -management per urology, pain control, IVF, incentive spirometry, DVT ppx  -postop lab reviewed: Hgb 13, creat 1.2, normal WBC  -dc planning home today per

## 2021-10-07 NOTE — DISCHARGE NOTE NURSING/CASE MANAGEMENT/SOCIAL WORK - PATIENT PORTAL LINK FT
You can access the FollowMyHealth Patient Portal offered by [facility] by registering at the following website: [email or web address]. By joining [facility]’s FollowMyHealth portal, you will also be able to view your health information using other applications (apps) compatible with our system.

## 2021-10-07 NOTE — DISCHARGE NOTE PROVIDER - HOSPITAL COURSE
52 yo M underwent uncomplicated robot assisted lap right pyeloplasty, ureteral stent placement on 10/6/2021.  Postoperative course uneventful, successful TOV on POD #1 with minimal PVR,  pain controlled, ambulating.  Return of GI function on POD #1, diet advanced without incident.  PORFIRIO output minimal, PORFIRIO d/c and pt d/c on POD #1 to f/u with Dr. Garcia.  I-stop checked.

## 2021-10-07 NOTE — CONSULT NOTE ADULT - SUBJECTIVE AND OBJECTIVE BOX
Meron Tovar MD  Pager [phone]    HPI:  52 y/o male with h/o cluster headache, retinitis pigmentosa c/b cataract, s/p cataract surgery, now legally blind, multiple diverticulitis, horseshoe kidney with chronic UPJ obstruction and severe right hydronephrosis, right sided kidney stone, admitted for robotic assisted lap right pyeloplasty with kidney stone removal, cystoscopy right ureteral stent placement on 10/6/21.    Patient seen on POD1, c/o right hand finger numbness from 2nd to 4th finger. H/o prior hand surgery to reattached an amputated 5th finger.  Denies h/o carpal tunnel syndrome or peripheral neuropathy.     He passed flatus, green removed, diet advanced. Denies chest pain/sob/dizziness. Denies h/o cardiovascular disease, preop echo/stress test normal LVEF 67%, no ischemia.     PAST MEDICAL & SURGICAL HISTORY:  Blindness    Cluster headache    Horseshoe kidney  Right    Congenital hydronephrosis  Right    History of diverticulitis    Left leg swelling  chronic    S/P cataract surgery  b/l    Amputation of digit of right hand  Right fifth digit reattachment    S/P ORIF (open reduction internal fixation) fracture  left arm    S/P correction of deviated nasal septum  x2        Review of Systems:   CONSTITUTIONAL: No fever, weight loss, or fatigue  EYES: legally blind, only sees shadows  ENMT:  No difficulty hearing, tinnitus, vertigo; No sinus or throat pain  NECK: No pain or stiffness  BREASTS: No pain, masses, or nipple discharge  RESPIRATORY: No cough, wheezing, chills or hemoptysis; No shortness of breath  CARDIOVASCULAR: No chest pain, palpitations, dizziness, or leg swelling  GASTROINTESTINAL: +abdominal pain in august,  No nausea, vomiting, or hematemesis; No diarrhea or constipation. No melena or hematochezia.  GENITOURINARY: No dysuria, frequency, hematuria, or incontinence  NEUROLOGICAL: No headaches, memory loss, loss of strength, +numbness of right hand fingers as above  SKIN: No itching, burning, rashes, or lesions   LYMPH NODES: No enlarged glands  ENDOCRINE: No heat or cold intolerance; No hair loss  MUSCULOSKELETAL: No joint pain or swelling; No muscle, back, or extremity pain  PSYCHIATRIC: No depression, anxiety, mood swings, or difficulty sleeping  HEME/LYMPH: No easy bruising, or bleeding gums  ALLERY AND IMMUNOLOGIC: No hives or eczema    Allergies    No Known Allergies    Intolerances    Social History: denies smoking or alcohol use    FAMILY HISTORY:  FH: type 2 diabetes (Father)    Family history of rectal cancer (Mother)        Home Medications:  Fish Oil 500 mg oral capsule: 1 cap(s) orally once a day, Last dose on 9/28/21 (06 Oct 2021 11:40)  Lutein 20 mg oral capsule: 1 cap(s) orally once a day, Last dose on 9/28/21 (06 Oct 2021 11:40)  vitamin a palmitate 00184 IU: 1 tab(s) orally once a day - Last dose on 9/28/21 (06 Oct 2021 11:40)  vitamin d: 1 tab(s) orally once a day (06 Oct 2021 11:40)      MEDICATIONS  (STANDING):  acetaminophen   Tablet .. 975 milliGRAM(s) Oral every 6 hours  heparin   Injectable 5000 Unit(s) SubCutaneous every 8 hours  influenza   Vaccine 0.5 milliLiter(s) IntraMuscular once  ketorolac   Injectable 15 milliGRAM(s) IV Push every 6 hours    MEDICATIONS  (PRN):  ondansetron Injectable 4 milliGRAM(s) IV Push every 6 hours PRN Nausea and/or Vomiting  phenazopyridine 200 milliGRAM(s) Oral three times a day PRN stent irritation  senna 2 Tablet(s) Oral at bedtime PRN Constipation      Vital Signs Last 24 Hrs  T(C): 36.9 (07 Oct 2021 09:35), Max: 37.1 (06 Oct 2021 19:15)  T(F): 98.5 (07 Oct 2021 09:35), Max: 98.8 (06 Oct 2021 19:15)  HR: 80 (07 Oct 2021 09:35) (75 - 95)  BP: 101/61 (07 Oct 2021 09:35) (101/61 - 131/96)  BP(mean): 71 (06 Oct 2021 21:15) (71 - 105)  RR: 16 (07 Oct 2021 09:35) (10 - 20)  SpO2: 98% (07 Oct 2021 09:35) (96% - 100%)  CAPILLARY BLOOD GLUCOSE        I&O's Summary    06 Oct 2021 07:01  -  07 Oct 2021 07:00  --------------------------------------------------------  IN: 625 mL / OUT: 1980 mL / NET: -1355 mL    07 Oct 2021 07:01  -  07 Oct 2021 11:16  --------------------------------------------------------  IN: 0 mL / OUT: 410 mL / NET: -410 mL        PHYSICAL EXAM:  GENERAL: NAD, well-developed  HEAD:  Atraumatic, Normocephalic  EYES: legally blind  NECK: Supple, No JVD  CHEST/LUNG: Clear to auscultation bilaterally; No wheeze  HEART: Regular rate and rhythm; No murmurs, rubs, or gallops  ABDOMEN: Soft, incision wound clean, Nontender, Nondistended; Bowel sounds present  : green removed  EXTREMITIES:  2+ Peripheral Pulses, No clubbing, cyanosis, or edema  PSYCH: AAOx3  NEUROLOGY: non-focal  SKIN: No rashes or lesions    LABS:                        13.1   8.17  )-----------( 177      ( 07 Oct 2021 06:32 )             37.8     10-07    139  |  103  |  19  ----------------------------<  130<H>  4.3   |  21<L>  |  1.20    Ca    9.1      07 Oct 2021 06:32        Microbiology     RADIOLOGY & ADDITIONAL TESTS:    Imaging Personally Reviewed:  < from: NM Renal/Lasix (08.31.21 @ 13:40) >  IMPRESSION: Diuretic renal scan demonstrates:    Normal flow to and function of  the right moiety of the horseshoe kidney with evidence of obstruction.    Normal flow to and function of  the left moiety of the horseshoe kidney with no evidence of obstruction.    Differential renal function: Right moiety: 44%; Left moiety: 66%.    Faint radiopharmaceutical uptake in the isthmus indicates that there is at least some functioning renal parenchyma present.      < from: CT Abdomen and Pelvis w/ IV Cont (06.27.21 @ 02:18) >    IMPRESSION:  Acute, uncomplicated sigmoid diverticulitis.  It is recommended that the patient have a follow-up colonoscopy after antibiotic therapy and resolution of acute symptoms to exclude an underlying colon cancer.    Mildbladder wall thickening is likely reactive.  Cystitis should be excluded based on clinical symptoms and urinalysis.    Horseshoe kidney.  There is severe hydronephrosis of the right renal moiety due to chronic UPJ obstruction, which has progressed from 12/15/2018.  There is now evidence of obstructive uropathy in the right renal moiety.  A 5 mm nonobstructing right-sided renal stone is unchanged from 12/15/2018.      Consultant(s) Notes Reviewed:      Care Discussed with Consultants/Other Providers: urology LINETTE Ha, right hand numbness could be from positioning during robotic surgery, pt will f/u his hand surgeon

## 2021-10-07 NOTE — DISCHARGE NOTE NURSING/CASE MANAGEMENT/SOCIAL WORK - NSDCPNINST_GEN_ALL_CORE
Notify  Notify Dr Garcia if you experience any increase in pain not relieved with medication, any redness, drainage or swelling around incision, any persistent nausea vomiting or if you develop a fever >100.5.  Drink plenty of fluids.  No heavy lifting or straining.   Notify Dr Garcia if you experience any increase in pain not relieved with medication, any redness, drainage or swelling around incision, any persistent nausea vomiting or if you develop a fever >100.5.  Drink plenty of fluids.  No heavy lifting or straining.  Use over the counter stool softeners to assist with constipation.

## 2021-10-07 NOTE — DISCHARGE NOTE PROVIDER - CARE PROVIDER_API CALL
Ele Garcia)  Urology  [address] [place] [address]  Phone: [phone]  Fax: [phone]  Follow Up Time:

## 2021-10-07 NOTE — DISCHARGE NOTE PROVIDER - NSDCCPCAREPLAN_GEN_ALL_CORE_FT
PRINCIPAL DISCHARGE DIAGNOSIS  Diagnosis: UPJ (ureteropelvic junction) obstruction  Assessment and Plan of Treatment: Keep well hydrated.  No heavy lifting (greater than 10 pounds) or straining for 4 to 6 weeks.  You may shower, just pat white strips dry, they will fall off in a few weeks. Change dressing at drain site daily or as needed until dry.   You may have intermittent blood tinged urine and slight flank pain when you urinate.  This is normal and due to the stent in your ureter.   If your urine becomes bright red or with clots, please call the office.  Call Dr. Garcia's office  to schedule a follow up appointment for stent removal and further management.  Call the office if you have fever greater than 101, difficulty urinating, pain not relieved with pain medication, nausea/vomiting.

## 2021-10-07 NOTE — PROGRESS NOTE ADULT - SUBJECTIVE AND OBJECTIVE BOX
ANESTHESIA POSTOP CHECK    51y Male POSTOP DAY 1 S/P     Vital Signs Last 24 Hrs  T(C): 36.9 (07 Oct 2021 09:35), Max: 37.1 (06 Oct 2021 19:15)  T(F): 98.5 (07 Oct 2021 09:35), Max: 98.8 (06 Oct 2021 19:15)  HR: 80 (07 Oct 2021 09:35) (75 - 96)  BP: 101/61 (07 Oct 2021 09:35) (101/61 - 137/76)  BP(mean): 71 (06 Oct 2021 21:15) (71 - 105)  RR: 16 (07 Oct 2021 09:35) (10 - 20)  SpO2: 98% (07 Oct 2021 09:35) (96% - 100%)  I&O's Summary    06 Oct 2021 07:01  -  07 Oct 2021 07:00  --------------------------------------------------------  IN: 625 mL / OUT: 1980 mL / NET: -1355 mL        [x ] NO APPARENT ANESTHESIA COMPLICATIONS      Comments: 
 Note    Post op Check    s/p Robotic-assisted right pyeloplasty   EBL 20ml    Patient seen and examined.  Reporting right shoulder pain, otherwise comfortable, denies nausea.     Vital Signs Last 24 Hrs  T(C): 36.5 (06 Oct 2021 17:20), Max: 36.7 (06 Oct 2021 11:12)  T(F): 97.7 (06 Oct 2021 17:20), Max: 98.1 (06 Oct 2021 11:12)  HR: 77 (06 Oct 2021 18:45) (75 - 96)  BP: 117/72 (06 Oct 2021 18:45) (112/73 - 137/76)  BP(mean): 83 (06 Oct 2021 18:45) (80 - 86)  RR: 14 (06 Oct 2021 18:45) (13 - 18)  SpO2: 98% (06 Oct 2021 18:45) (96% - 100%)    I&O's Summary    06 Oct 2021 07:01  -  06 Oct 2021 20:00  --------------------------------------------------------  IN: 250 mL / OUT: 745 mL / NET: -495 mL    PHYSICAL EXAM:   Constitutional: well appearing, resting comfortably, A+O    Respiratory: clear    Cardiovascular: regular     Gastrointestinal: soft, nontender, no distention    Genitourinary: green in place, draining yellow, positional green, requires maneuvering at times for drainage, output 745ml since procedure.     Extremities: venodynes in place.                       
Subjective/24hour Events:   Patient seen and examined this AM. NO acute events overnight. Doing well. Experiencing abd soreness, but believes the pain in manageable on current pain regimen. Endorses passing gas, no BM. Has yet to be out of bed and ambulating. Plans to walk around today. Patient also complaining of right hand numbness in all fingers for the last 12 hours since surgery. Like his hand fell asleep and is still waking up. Otherwise, denies N/V. No other complaints at this time.        Vital Signs:  Vital Signs Last 24 Hrs  T(C): 36.7 (07 Oct 2021 05:35), Max: 37.1 (06 Oct 2021 19:15)  T(F): 98.1 (07 Oct 2021 05:35), Max: 98.8 (06 Oct 2021 19:15)  HR: 80 (07 Oct 2021 05:35) (75 - 96)  BP: 106/73 (07 Oct 2021 05:35) (101/63 - 137/76)  BP(mean): 71 (06 Oct 2021 21:15) (71 - 105)  RR: 16 (07 Oct 2021 05:35) (10 - 20)  SpO2: 97% (07 Oct 2021 05:35) (96% - 100%)    CAPILLARY BLOOD GLUCOSE          I&O's Detail    06 Oct 2021 07:01  -  07 Oct 2021 06:37  --------------------------------------------------------  IN:    Lactated Ringers: 625 mL  Total IN: 625 mL    OUT:    Drain (mL): 10 mL    Indwelling Catheter - Urethral (mL): 1970 mL  Total OUT: 1980 mL    Total NET: -1355 mL          MEDICATIONS  (STANDING):  acetaminophen   Tablet .. 975 milliGRAM(s) Oral every 6 hours  heparin   Injectable 5000 Unit(s) SubCutaneous every 8 hours  ketorolac   Injectable 15 milliGRAM(s) IV Push every 6 hours  lactated ringers. 1000 milliLiter(s) (125 mL/Hr) IV Continuous <Continuous>    MEDICATIONS  (PRN):  ondansetron Injectable 4 milliGRAM(s) IV Push every 6 hours PRN Nausea and/or Vomiting  senna 2 Tablet(s) Oral at bedtime PRN Constipation      Physical Exam:  Gen: NAD.  Lungs: Non labored breathing.   Ab: Soft, appropriately tender, nondistended, PORFIRIO drain in place with serosanguinous output, incisions C/D/I  : Perez in place, removed on morning rounds. Dark yellow, slightly red urine output.   Ext: Moves all 4 spontaneously.     Labs: Pending

## 2021-10-07 NOTE — PROGRESS NOTE ADULT - ASSESSMENT
Patient is a 51 year old male s/p Robotic-assisted right pyeloplasty and stent placement. Doing well postoperatively.    Plan:    - Strict I and O  - DVT ppx continue  - Diet- Advance from clears to regular diet  - Continue incentive spirometry  - IVF- Continue   - Perez was discontinued this AM, f/u TOV   - Continue current pain regimen  - F/u AM labs and PORFIRIO creatinine  - Encourage OOB  - Discharge plan

## 2021-10-07 NOTE — CONSULT NOTE ADULT - ASSESSMENT
52 y/o male with h/o cluster headache, retinitis pigmentosa c/b cataract s/p cataract surgery, now legally blind, multiple diverticulitis, horseshoe kidney with chronic UPJ obstruction and severe right hydronephrosis, right sided kidney stone, s/p RAL R pyeloplasty/stent placement on 10/6

## 2021-10-13 ENCOUNTER — APPOINTMENT (OUTPATIENT)
Dept: UROLOGY | Facility: CLINIC | Age: 51
End: 2021-10-13
Payer: COMMERCIAL

## 2021-10-13 PROCEDURE — 99024 POSTOP FOLLOW-UP VISIT: CPT

## 2021-10-14 LAB
APPEARANCE: CLEAR
BACTERIA UR CULT: NORMAL
BACTERIA: NEGATIVE
BILIRUBIN URINE: NEGATIVE
BLOOD URINE: ABNORMAL
COLOR: NORMAL
GLUCOSE QUALITATIVE U: NEGATIVE
HYALINE CASTS: 0 /LPF
KETONES URINE: NEGATIVE
LEUKOCYTE ESTERASE URINE: ABNORMAL
MICROSCOPIC-UA: NORMAL
NITRITE URINE: NEGATIVE
PH URINE: 6
PROTEIN URINE: NORMAL
RED BLOOD CELLS URINE: 15 /HPF
SPECIFIC GRAVITY URINE: 1.01
SQUAMOUS EPITHELIAL CELLS: 2 /HPF
UROBILINOGEN URINE: NORMAL
WHITE BLOOD CELLS URINE: 2 /HPF

## 2021-10-14 RX ORDER — TAMSULOSIN HYDROCHLORIDE 0.4 MG/1
0.4 CAPSULE ORAL
Qty: 30 | Refills: 1 | Status: ACTIVE | COMMUNITY
Start: 2021-10-14 | End: 1900-01-01

## 2021-10-14 NOTE — ASSESSMENT
[FreeTextEntry1] : s/p RAL R pyeloplasty in horseshoe kidney. Doing well. Suspect urinary sx 2/2 stent. \par --UA, UCx\par --begin flomax\par --RTC in 3 weeks for cysto stent removal\par

## 2021-10-14 NOTE — HISTORY OF PRESENT ILLNESS
[FreeTextEntry1] : 50yo gentleman with cc of R hydro and horseshoe kidney. Pt was seen by Dr Cee earlier this year after ER episode for abd pain. He has known horseshoe kidney with R hydro that was worsened as compared to 2018. He has issues with recurrent diverticulitis and this is how hydro was noted. No prior hx of stones. No episodes of pyelo. No issues with flank pain/Dietl's crisis. CT showed horseshoe kidney with severe R hydro and cortical thinning. Has 7mm RLP stone. Plan made for lasix renal scan for eval. This showed 44% function on the R vs 56% L (with some function in isthmus). The L side drains appropriately with T1/2 of 4min. The R showed no drainage even after diuretic challenge. \par \par he underwent uncomplicated RAL R pyeloplasty on 10/6. He returns today for follow-up. Pain has been well controlled. He has resumed normal ADLs. Bowels normal. Does note some increased urination but no dysuria. No fevers or chills. \par \par No prior abd surgery. Pt is seeing Dr Cox for eval of recurrent diverticulitis

## 2021-10-14 NOTE — PHYSICAL EXAM
[General Appearance - Well Developed] : well developed [Abdomen Soft] : soft [Abdomen Tenderness] : non-tender [Costovertebral Angle Tenderness] : no ~M costovertebral angle tenderness [Exaggerated Use Of Accessory Muscles For Inspiration] : no accessory muscle use [Oriented To Time, Place, And Person] : oriented to person, place, and time [Normal Station and Gait] : the gait and station were normal for the patient's age [FreeTextEntry1] : incisions healing well

## 2021-10-15 ENCOUNTER — RX CHANGE (OUTPATIENT)
Age: 51
End: 2021-10-15

## 2021-10-25 ENCOUNTER — TRANSCRIPTION ENCOUNTER (OUTPATIENT)
Age: 51
End: 2021-10-25

## 2021-11-01 ENCOUNTER — APPOINTMENT (OUTPATIENT)
Dept: UROLOGY | Facility: CLINIC | Age: 51
End: 2021-11-01
Payer: COMMERCIAL

## 2021-11-01 ENCOUNTER — OUTPATIENT (OUTPATIENT)
Dept: OUTPATIENT SERVICES | Facility: HOSPITAL | Age: 51
LOS: 1 days | End: 2021-11-01
Payer: COMMERCIAL

## 2021-11-01 DIAGNOSIS — Q62.39 OTHER OBSTRUCTIVE DEFECTS OF RENAL PELVIS AND URETER: ICD-10-CM

## 2021-11-01 DIAGNOSIS — Z98.890 OTHER SPECIFIED POSTPROCEDURAL STATES: Chronic | ICD-10-CM

## 2021-11-01 DIAGNOSIS — N13.30 UNSPECIFIED HYDRONEPHROSIS: ICD-10-CM

## 2021-11-01 DIAGNOSIS — Z98.49 CATARACT EXTRACTION STATUS, UNSPECIFIED EYE: Chronic | ICD-10-CM

## 2021-11-01 DIAGNOSIS — R35.0 FREQUENCY OF MICTURITION: ICD-10-CM

## 2021-11-01 DIAGNOSIS — S68.119A COMPLETE TRAUMATIC METACARPOPHALANGEAL AMPUTATION OF UNSPECIFIED FINGER, INITIAL ENCOUNTER: Chronic | ICD-10-CM

## 2021-11-01 PROCEDURE — 52310 CYSTOSCOPY AND TREATMENT: CPT | Mod: 58

## 2021-11-01 PROCEDURE — 52310 CYSTOSCOPY AND TREATMENT: CPT

## 2021-12-13 ENCOUNTER — APPOINTMENT (OUTPATIENT)
Dept: UROLOGY | Facility: CLINIC | Age: 51
End: 2021-12-13
Payer: COMMERCIAL

## 2021-12-13 ENCOUNTER — APPOINTMENT (OUTPATIENT)
Dept: UROLOGY | Facility: CLINIC | Age: 51
End: 2021-12-13

## 2021-12-13 ENCOUNTER — OUTPATIENT (OUTPATIENT)
Dept: OUTPATIENT SERVICES | Facility: HOSPITAL | Age: 51
LOS: 1 days | End: 2021-12-13
Payer: COMMERCIAL

## 2021-12-13 DIAGNOSIS — S68.119A COMPLETE TRAUMATIC METACARPOPHALANGEAL AMPUTATION OF UNSPECIFIED FINGER, INITIAL ENCOUNTER: Chronic | ICD-10-CM

## 2021-12-13 DIAGNOSIS — Z98.890 OTHER SPECIFIED POSTPROCEDURAL STATES: Chronic | ICD-10-CM

## 2021-12-13 DIAGNOSIS — R35.0 FREQUENCY OF MICTURITION: ICD-10-CM

## 2021-12-13 DIAGNOSIS — Z98.49 CATARACT EXTRACTION STATUS, UNSPECIFIED EYE: Chronic | ICD-10-CM

## 2021-12-13 PROCEDURE — 76775 US EXAM ABDO BACK WALL LIM: CPT | Mod: 26

## 2021-12-13 PROCEDURE — 76775 US EXAM ABDO BACK WALL LIM: CPT

## 2021-12-13 PROCEDURE — 99213 OFFICE O/P EST LOW 20 MIN: CPT | Mod: 24

## 2021-12-13 NOTE — HISTORY OF PRESENT ILLNESS
[FreeTextEntry1] : 50yo gentleman with cc of R hydro and horseshoe kidney. Pt was seen by Dr Cee earlier this year after ER episode for abd pain. He has known horseshoe kidney with R hydro that was worsened as compared to 2018. He has issues with recurrent diverticulitis and this is how hydro was noted. No prior hx of stones. No episodes of pyelo. No issues with flank pain/Dietl's crisis. CT showed horseshoe kidney with severe R hydro and cortical thinning. Has 7mm RLP stone. Plan made for lasix renal scan for eval. This showed 44% function on the R vs 56% L (with some function in isthmus). The L side drains appropriately with T1/2 of 4min. The R showed no drainage even after diuretic challenge. \par \par He underwent uncomplicated RAL R pyeloplasty on 10/6. Stent removed 11/1. He is now 9 weeks post op. He returns today for follow-up. Pain has been well controlled. He has resumed normal ADLs. Bowels normal. Denies any urinary bother such as dysuria or frequency. Denies hematuria but is visually impaired. No flank pain but does endorse minimal tightness over the right flank. No fevers or chills. Urinary much improved after removal of stent. \par \par US today shows improvement in hydro to mod from severe. No flank pain. No hematuria. \par \par No prior abd surgery. Pt is seeing Dr Cox for eval of recurrent diverticulitis \par  \par \par \par

## 2021-12-13 NOTE — ASSESSMENT
[FreeTextEntry1] : s/p RAL R pyeloplasty in horseshoe kidney. Doing well s/p stent removal on 11/1.  Renal ultrasound today showed improvement in right hydro, now with moderate hydronephrosis. Discussed eval with lasix renal scan but pt does not want to have to repeat unless absolutely necessary due to bother in the past. Will follow with US instead. \par --Recommended continue to drink plenty of water \par --Can resume daily activities and lift weights over 10 lbs now\par --RTO in 1 year for repeat renal U/S

## 2021-12-15 DIAGNOSIS — Q63.1 LOBULATED, FUSED AND HORSESHOE KIDNEY: ICD-10-CM

## 2021-12-15 DIAGNOSIS — Q62.39 OTHER OBSTRUCTIVE DEFECTS OF RENAL PELVIS AND URETER: ICD-10-CM

## 2021-12-15 DIAGNOSIS — N13.30 UNSPECIFIED HYDRONEPHROSIS: ICD-10-CM

## 2022-02-17 NOTE — CONSULT NOTE ADULT - PROBLEM SELECTOR PROBLEM 1
DATE: 2022  PATIENT: Jass Girard  YOB: 1985  MRN: [id number]  Referred By: Tiffany Cristina DO  HEME/ONC PROVIDER: Dr. Silvano Martin      CHIEF COMPLAINT  Jass Girard is a 36 year old female whom I am seeing at the kind request of Tiffany Cristina DO for further evaluation and management of iron deficiency anemia.      HISTORY OF PRESENT ILLNESS       2018: Gastric bypass  2022: Hgb-7.3, ferritin-3, Fe sat-4%  2022: Transfer of care from Dr Higginbotham to Dr Mario Washington regular    Patient's medications, allergies, past medical, surgical, social and family histories were reviewed and updated as appropriate.    PAST MEDICAL HISTORY    Past Medical History:   Diagnosis Date   • Acne        PAST SURGICAL HISTORY    Past Surgical History:   Procedure Laterality Date   •  section, classic     • Dilation and curettage  2022   • Gastric bypass  2018   • Hernia repair     • Tonsillectomy         FAMILY MEDICAL HISTORY    Family History   Problem Relation Age of Onset   • Hypertension Mother    • Hypertension Maternal Grandmother        SOCIAL HISTORY     Tobacco:  None  Alcohol:  Occasional or social      MEDICATIONS    Current Outpatient Medications   Medication Sig   • vitamin D, Cholecalciferol, 25 mcg (1,000 units) capsule Take 1 capsule by mouth daily.   • etonogestrel (NEXPLANON) 68 MG implant 68 mg by Subdermal route 1 time.   • ferrous sulfate 325 (65 FE) MG EC tablet Take 1 tablet by mouth 2 times daily.   • acetaminophen (TYLENOL) 500 MG tablet Take 1 tablet by mouth every 6 hours as needed.   • ibuprofen (MOTRIN) 600 MG tablet Take 600 mg by mouth.     No current facility-administered medications for this visit.       ALLERGIES    ALLERGIES:   Allergen Reactions   • Motrin Other (See Comments)     Stomach pain           REVIEW OF SYSTEMS        Review of Systems   Constitutional: Negative.    HENT: Negative.  Negative for congestion, mouth sores,  nosebleeds, sore throat and tinnitus.    Eyes: Negative.    Respiratory: Negative.    Cardiovascular: Negative.    Gastrointestinal: Negative.    Endocrine: Negative.    Genitourinary: Negative.    Musculoskeletal: Negative.    Skin: Negative.    Allergic/Immunologic: Negative.    Neurological: Negative.  Negative for dizziness.   Hematological: Negative.    Psychiatric/Behavioral: Negative.    All other systems reviewed and are negative.          VITALS  Visit Vitals  /90 (BP Location: LUE - Left upper extremity, Patient Position: Sitting, Cuff Size: Regular)   Pulse 79   Temp 98 °F (36.7 °C) (Oral)   Ht 5' 3.5\" (1.613 m)   Wt 132.3 kg (291 lb 10.7 oz)   LMP 12/01/2021   SpO2 100%   BMI 50.86 kg/m²     Body Surface Area: Body surface area is 2.28 meters squared.    PHYSICAL EXAM  Physical Exam  Vitals reviewed.   Constitutional:       Appearance: Normal appearance.   HENT:      Head: Normocephalic and atraumatic.      Nose: Nose normal.      Mouth/Throat:      Mouth: Mucous membranes are moist.      Pharynx: Oropharynx is clear.      Neck: Full passive range of motion without pain, normal range of motion and neck supple.   Cardiovascular:      Rate and Rhythm: Normal rate and regular rhythm.   Pulmonary:      Effort: Pulmonary effort is normal.      Breath sounds: Normal breath sounds.   Abdominal:      General: Abdomen is flat. Bowel sounds are normal.      Palpations: Abdomen is soft.   Skin:     General: Skin is warm and dry.   Neurological:      Mental Status: She is alert.   Psychiatric:         Mood and Affect: Mood normal.         Behavior: Behavior normal.         LABS    WBC (K/mcL)   Date Value   01/31/2022 5.8     HCT (%)   Date Value   01/31/2022 24.8 (L)     HGB (g/dL)   Date Value   01/31/2022 7.3 (L)       PLT (K/mcL)   Date Value   01/31/2022 348     Glucose (mg/dL)   Date Value   10/25/2021 90     Sodium (mmol/L)   Date Value   10/25/2021 143     Potassium (mmol/L)   Date Value   10/25/2021  4.5     Chloride (mmol/L)   Date Value   10/25/2021 113 (H)     Carbon Dioxide (mmol/L)   Date Value   10/25/2021 22     BUN (mg/dL)   Date Value   10/25/2021 13     Creatinine (mg/dL)   Date Value   10/25/2021 0.90     Protein, Total (g/dL)   Date Value   01/31/2022 6.7     Albumin (g/dL)   Date Value   10/25/2021 3.0 (L)     Calcium (mg/dL)   Date Value   10/25/2021 8.2 (L)     Bilirubin, Total (mg/dL)   Date Value   10/25/2021 0.3     GOT/AST (Units/L)   Date Value   10/25/2021 15     Alkaline Phosphatase (Units/L)   Date Value   10/25/2021 123 (H)     GPT/ALT (Units/L)   Date Value   10/25/2021 28     Anion Gap (mmol/L)   Date Value   10/25/2021 13     BUN/ Creatinine Ratio (no units)   Date Value   10/25/2021 14     Globulin (g/dL)   Date Value   10/25/2021 4.3 (H)     A/G Ratio (no units)   Date Value   10/25/2021 0.7 (L)     GFR Estimate, Non  (no units)   Date Value   02/12/2020 >90     GFR Estimate,  (no units)   Date Value   02/12/2020 >90            ASSESSMENT/PLAN      1. Iron deficiency anemia   -Does not tolerate oral iron supplementation   -Gastric bypass   -1/31/2022: Ferritin-3    Plan:  1. Restart IV fe infusions. Iron sucrose ordered. Discussed with patient the natural history, course and prognosis of illness.    Therapeutic options discussed in detail.  Risks and benefits, and alternative treatments discussed. Educational material provided to patient. All questions were answered. Patient acknowledged understanding of disease, treatment plan, and potential toxicities. Informed consent signed.  2. RTC 2 months  3. Labs one week prior to visit          ORDERS  Orders Placed This Encounter   • CBC with Automated Differential   • Ferritin   • Iron And total Iron Binding Capacity   • Vitamin B12 And Folate   • Infusion Appointment Request   • Infusion Appointment Request   • Infusion Appointment Request   • Infusion Appointment Request   • Infusion Appointment Request          FOLLOW UP  Return in about 2 months (around 4/17/2022).        Learning needs assessed. Learning intervention was not required.    Above plan was discussed with patient and family and all pertinent questions were answered. At the end of the evaluation, the patient was asked if all complaints had been addressed today to her satisfaction and she responded affirmatively.      Thank you for allowing me to participate in your patient's healthcare management. Please feel free to contact me with any questions.    Sincerely,    Silvano Martin MD     Numbness of right hand

## 2022-12-09 ENCOUNTER — OUTPATIENT (OUTPATIENT)
Dept: OUTPATIENT SERVICES | Facility: HOSPITAL | Age: 52
LOS: 1 days | End: 2022-12-09
Payer: COMMERCIAL

## 2022-12-09 ENCOUNTER — APPOINTMENT (OUTPATIENT)
Dept: UROLOGY | Facility: CLINIC | Age: 52
End: 2022-12-09

## 2022-12-09 DIAGNOSIS — Z98.49 CATARACT EXTRACTION STATUS, UNSPECIFIED EYE: Chronic | ICD-10-CM

## 2022-12-09 DIAGNOSIS — Z98.890 OTHER SPECIFIED POSTPROCEDURAL STATES: Chronic | ICD-10-CM

## 2022-12-09 DIAGNOSIS — R35.0 FREQUENCY OF MICTURITION: ICD-10-CM

## 2022-12-09 DIAGNOSIS — S68.119A COMPLETE TRAUMATIC METACARPOPHALANGEAL AMPUTATION OF UNSPECIFIED FINGER, INITIAL ENCOUNTER: Chronic | ICD-10-CM

## 2022-12-09 PROCEDURE — 76775 US EXAM ABDO BACK WALL LIM: CPT | Mod: 26

## 2022-12-09 PROCEDURE — 76775 US EXAM ABDO BACK WALL LIM: CPT

## 2022-12-12 LAB
APPEARANCE: CLEAR
BACTERIA UR CULT: NORMAL
BACTERIA: NEGATIVE
BILIRUBIN URINE: NEGATIVE
BLOOD URINE: NEGATIVE
COLOR: NORMAL
GLUCOSE QUALITATIVE U: NEGATIVE
HYALINE CASTS: 0 /LPF
KETONES URINE: NEGATIVE
LEUKOCYTE ESTERASE URINE: NEGATIVE
MICROSCOPIC-UA: NORMAL
NITRITE URINE: NEGATIVE
PH URINE: 6.5
PROTEIN URINE: NEGATIVE
RED BLOOD CELLS URINE: 0 /HPF
SPECIFIC GRAVITY URINE: 1.01
SQUAMOUS EPITHELIAL CELLS: 0 /HPF
UROBILINOGEN URINE: NORMAL
WHITE BLOOD CELLS URINE: 0 /HPF

## 2022-12-13 DIAGNOSIS — N13.30 UNSPECIFIED HYDRONEPHROSIS: ICD-10-CM

## 2022-12-13 DIAGNOSIS — Q62.39 OTHER OBSTRUCTIVE DEFECTS OF RENAL PELVIS AND URETER: ICD-10-CM

## 2022-12-14 ENCOUNTER — TRANSCRIPTION ENCOUNTER (OUTPATIENT)
Age: 52
End: 2022-12-14

## 2022-12-26 ENCOUNTER — OUTPATIENT (OUTPATIENT)
Dept: OUTPATIENT SERVICES | Facility: HOSPITAL | Age: 52
LOS: 1 days | End: 2022-12-26
Payer: COMMERCIAL

## 2022-12-26 ENCOUNTER — APPOINTMENT (OUTPATIENT)
Dept: CT IMAGING | Facility: IMAGING CENTER | Age: 52
End: 2022-12-26
Payer: COMMERCIAL

## 2022-12-26 DIAGNOSIS — S68.119A COMPLETE TRAUMATIC METACARPOPHALANGEAL AMPUTATION OF UNSPECIFIED FINGER, INITIAL ENCOUNTER: Chronic | ICD-10-CM

## 2022-12-26 DIAGNOSIS — Z98.890 OTHER SPECIFIED POSTPROCEDURAL STATES: Chronic | ICD-10-CM

## 2022-12-26 DIAGNOSIS — N13.30 UNSPECIFIED HYDRONEPHROSIS: ICD-10-CM

## 2022-12-26 DIAGNOSIS — Z98.49 CATARACT EXTRACTION STATUS, UNSPECIFIED EYE: Chronic | ICD-10-CM

## 2022-12-26 DIAGNOSIS — Q62.39 OTHER OBSTRUCTIVE DEFECTS OF RENAL PELVIS AND URETER: ICD-10-CM

## 2022-12-26 PROCEDURE — 74178 CT ABD&PLV WO CNTR FLWD CNTR: CPT

## 2022-12-26 PROCEDURE — 74178 CT ABD&PLV WO CNTR FLWD CNTR: CPT | Mod: 26

## 2022-12-29 ENCOUNTER — NON-APPOINTMENT (OUTPATIENT)
Age: 52
End: 2022-12-29

## 2023-01-03 ENCOUNTER — TRANSCRIPTION ENCOUNTER (OUTPATIENT)
Age: 53
End: 2023-01-03

## 2023-03-15 NOTE — DISCHARGE NOTE PROVIDER - NSDCCAREPROVSEEN_GEN_ALL_CORE_FT
spent in determining the total time component. Counseling and education regarding diagnosis listed and options regarding those diagnoses were also included in determining the time component.          KEITH Patel NP
Ele Garcia

## 2023-10-20 ENCOUNTER — TRANSCRIPTION ENCOUNTER (OUTPATIENT)
Age: 53
End: 2023-10-20

## 2023-10-23 ENCOUNTER — TRANSCRIPTION ENCOUNTER (OUTPATIENT)
Age: 53
End: 2023-10-23

## 2023-12-23 ENCOUNTER — OUTPATIENT (OUTPATIENT)
Dept: OUTPATIENT SERVICES | Facility: HOSPITAL | Age: 53
LOS: 1 days | End: 2023-12-23
Payer: COMMERCIAL

## 2023-12-23 ENCOUNTER — APPOINTMENT (OUTPATIENT)
Dept: ULTRASOUND IMAGING | Facility: CLINIC | Age: 53
End: 2023-12-23
Payer: COMMERCIAL

## 2023-12-23 DIAGNOSIS — S68.119A COMPLETE TRAUMATIC METACARPOPHALANGEAL AMPUTATION OF UNSPECIFIED FINGER, INITIAL ENCOUNTER: Chronic | ICD-10-CM

## 2023-12-23 DIAGNOSIS — Z98.890 OTHER SPECIFIED POSTPROCEDURAL STATES: Chronic | ICD-10-CM

## 2023-12-23 DIAGNOSIS — Q62.39 OTHER OBSTRUCTIVE DEFECTS OF RENAL PELVIS AND URETER: ICD-10-CM

## 2023-12-23 DIAGNOSIS — Z98.49 CATARACT EXTRACTION STATUS, UNSPECIFIED EYE: Chronic | ICD-10-CM

## 2023-12-23 DIAGNOSIS — Z00.8 ENCOUNTER FOR OTHER GENERAL EXAMINATION: ICD-10-CM

## 2023-12-23 DIAGNOSIS — Q63.1 LOBULATED, FUSED AND HORSESHOE KIDNEY: ICD-10-CM

## 2023-12-23 DIAGNOSIS — N13.30 UNSPECIFIED HYDRONEPHROSIS: ICD-10-CM

## 2023-12-23 PROCEDURE — 76775 US EXAM ABDO BACK WALL LIM: CPT | Mod: 26

## 2023-12-23 PROCEDURE — 76775 US EXAM ABDO BACK WALL LIM: CPT

## 2024-01-12 ENCOUNTER — APPOINTMENT (OUTPATIENT)
Dept: UROLOGY | Facility: CLINIC | Age: 54
End: 2024-01-12
Payer: COMMERCIAL

## 2024-01-12 VITALS
TEMPERATURE: 97.5 F | HEART RATE: 85 BPM | RESPIRATION RATE: 16 BRPM | DIASTOLIC BLOOD PRESSURE: 86 MMHG | SYSTOLIC BLOOD PRESSURE: 128 MMHG | OXYGEN SATURATION: 97 %

## 2024-01-12 DIAGNOSIS — Q63.1 LOBULATED, FUSED AND HORSESHOE KIDNEY: ICD-10-CM

## 2024-01-12 DIAGNOSIS — N20.0 CALCULUS OF KIDNEY: ICD-10-CM

## 2024-01-12 DIAGNOSIS — N20.0 LOBULATED, FUSED AND HORSESHOE KIDNEY: ICD-10-CM

## 2024-01-12 DIAGNOSIS — N13.30 UNSPECIFIED HYDRONEPHROSIS: ICD-10-CM

## 2024-01-12 DIAGNOSIS — Q62.39 OTHER OBSTRUCTIVE DEFECTS OF RENAL PELVIS AND URETER: ICD-10-CM

## 2024-01-12 PROCEDURE — G2211 COMPLEX E/M VISIT ADD ON: CPT

## 2024-01-12 PROCEDURE — 99213 OFFICE O/P EST LOW 20 MIN: CPT

## 2024-01-18 PROBLEM — N20.0 KIDNEY STONE: Status: ACTIVE | Noted: 2024-01-18

## 2024-01-18 NOTE — ASSESSMENT
[FreeTextEntry1] : S/p R UPJO s/p pyeloplasty. Hx of stone. stable --F/u Dr Cee in 1y with US --Encourage fluid intake

## 2024-01-18 NOTE — HISTORY OF PRESENT ILLNESS
[FreeTextEntry1] : 54yo gentleman with cc of R hydro and horseshoe kidney. Pt was seen by Dr Cee earlier this year after ER episode for abd pain. He has known horseshoe kidney with R hydro that was worsened as compared to 2018. He has issues with recurrent diverticulitis and this is how hydro was noted. No prior hx of stones. No episodes of pyelo. No issues with flank pain/Dietl's crisis. CT showed horseshoe kidney with severe R hydro and cortical thinning. Has 7mm RLP stone. Plan made for lasix renal scan for eval. This showed 44% function on the R vs 56% L (with some function in isthmus). The L side drains appropriately with T1/2 of 4min. The R showed no drainage even after diuretic challenge.   He underwent uncomplicated RAL R pyeloplasty on 10/2021 Post op US showed improvement in hydro from severe to mod.   Pt returns today for follow-up. He has an occasional R sided pain. Has occurred about 3x in the last year. Had US that showed hydro now mild to mod. Stone still noted (seen on CT). He drinks a lot of coffee. Has urinary frequency. Gets up 2x per night to go. Has hx of orthostatic hypotension and so can't take flomax.

## 2024-02-13 NOTE — ED PROVIDER NOTE - CHPI ED SYMPTOMS NEG
[3] : 2) Feeling down, depressed, or hopeless for nearly every day (3) [PHQ-2 Positive] : PHQ-2 Positive [[id number]] : 6 fever, chills, vomiting, diarrhea

## 2024-08-28 NOTE — ASU PREOP CHECKLIST - IV STARTED
Detail Level: Detailed Quality 226: Preventive Care And Screening: Tobacco Use: Screening And Cessation Intervention: Patient screened for tobacco use and is an ex/non-smoker Quality 130: Documentation Of Current Medications In The Medical Record: Current Medications Documented yes

## 2024-11-11 ENCOUNTER — OUTPATIENT (OUTPATIENT)
Dept: OUTPATIENT SERVICES | Facility: HOSPITAL | Age: 54
LOS: 1 days | End: 2024-11-11
Payer: COMMERCIAL

## 2024-11-11 ENCOUNTER — APPOINTMENT (OUTPATIENT)
Dept: ULTRASOUND IMAGING | Facility: CLINIC | Age: 54
End: 2024-11-11
Payer: COMMERCIAL

## 2024-11-11 DIAGNOSIS — Z98.890 OTHER SPECIFIED POSTPROCEDURAL STATES: Chronic | ICD-10-CM

## 2024-11-11 DIAGNOSIS — Z98.49 CATARACT EXTRACTION STATUS, UNSPECIFIED EYE: Chronic | ICD-10-CM

## 2024-11-11 DIAGNOSIS — S68.119A COMPLETE TRAUMATIC METACARPOPHALANGEAL AMPUTATION OF UNSPECIFIED FINGER, INITIAL ENCOUNTER: Chronic | ICD-10-CM

## 2024-11-11 DIAGNOSIS — Q63.1 LOBULATED, FUSED AND HORSESHOE KIDNEY: ICD-10-CM

## 2024-11-11 PROCEDURE — 76775 US EXAM ABDO BACK WALL LIM: CPT | Mod: 26

## 2024-11-11 PROCEDURE — 76775 US EXAM ABDO BACK WALL LIM: CPT

## 2024-12-02 ENCOUNTER — INPATIENT (INPATIENT)
Facility: HOSPITAL | Age: 54
LOS: 1 days | Discharge: HOME | End: 2024-12-04
Attending: STUDENT IN AN ORGANIZED HEALTH CARE EDUCATION/TRAINING PROGRAM | Admitting: STUDENT IN AN ORGANIZED HEALTH CARE EDUCATION/TRAINING PROGRAM
Payer: COMMERCIAL

## 2024-12-02 VITALS
SYSTOLIC BLOOD PRESSURE: 141 MMHG | OXYGEN SATURATION: 100 % | HEIGHT: 74 IN | HEART RATE: 106 BPM | RESPIRATION RATE: 18 BRPM | WEIGHT: 194.01 LBS | DIASTOLIC BLOOD PRESSURE: 83 MMHG | TEMPERATURE: 100 F

## 2024-12-02 DIAGNOSIS — Z98.49 CATARACT EXTRACTION STATUS, UNSPECIFIED EYE: Chronic | ICD-10-CM

## 2024-12-02 DIAGNOSIS — Z98.890 OTHER SPECIFIED POSTPROCEDURAL STATES: Chronic | ICD-10-CM

## 2024-12-02 DIAGNOSIS — S68.119A COMPLETE TRAUMATIC METACARPOPHALANGEAL AMPUTATION OF UNSPECIFIED FINGER, INITIAL ENCOUNTER: Chronic | ICD-10-CM

## 2024-12-02 LAB
ALBUMIN SERPL ELPH-MCNC: 3.7 G/DL — SIGNIFICANT CHANGE UP (ref 3.3–5)
ALP SERPL-CCNC: 64 U/L — SIGNIFICANT CHANGE UP (ref 40–120)
ALT FLD-CCNC: 17 U/L — SIGNIFICANT CHANGE UP (ref 4–41)
ANION GAP SERPL CALC-SCNC: 13 MMOL/L — SIGNIFICANT CHANGE UP (ref 7–14)
AST SERPL-CCNC: 23 U/L — SIGNIFICANT CHANGE UP (ref 4–40)
BASOPHILS # BLD AUTO: 0.03 K/UL — SIGNIFICANT CHANGE UP (ref 0–0.2)
BASOPHILS NFR BLD AUTO: 0.5 % — SIGNIFICANT CHANGE UP (ref 0–2)
BILIRUB SERPL-MCNC: 0.5 MG/DL — SIGNIFICANT CHANGE UP (ref 0.2–1.2)
BUN SERPL-MCNC: 14 MG/DL — SIGNIFICANT CHANGE UP (ref 7–23)
CALCIUM SERPL-MCNC: 9.1 MG/DL — SIGNIFICANT CHANGE UP (ref 8.4–10.5)
CHLORIDE SERPL-SCNC: 104 MMOL/L — SIGNIFICANT CHANGE UP (ref 98–107)
CO2 SERPL-SCNC: 25 MMOL/L — SIGNIFICANT CHANGE UP (ref 22–31)
CREAT SERPL-MCNC: 0.89 MG/DL — SIGNIFICANT CHANGE UP (ref 0.5–1.3)
D DIMER BLD IA.RAPID-MCNC: 1454 NG/ML DDU — HIGH
EGFR: 103 ML/MIN/1.73M2 — SIGNIFICANT CHANGE UP
EOSINOPHIL # BLD AUTO: 0.15 K/UL — SIGNIFICANT CHANGE UP (ref 0–0.5)
EOSINOPHIL NFR BLD AUTO: 2.6 % — SIGNIFICANT CHANGE UP (ref 0–6)
FLUAV AG NPH QL: SIGNIFICANT CHANGE UP
FLUBV AG NPH QL: SIGNIFICANT CHANGE UP
GLUCOSE SERPL-MCNC: 103 MG/DL — HIGH (ref 70–99)
HCT VFR BLD CALC: 38.2 % — LOW (ref 39–50)
HGB BLD-MCNC: 13.2 G/DL — SIGNIFICANT CHANGE UP (ref 13–17)
IANC: 3.52 K/UL — SIGNIFICANT CHANGE UP (ref 1.8–7.4)
IMM GRANULOCYTES NFR BLD AUTO: 0.2 % — SIGNIFICANT CHANGE UP (ref 0–0.9)
LYMPHOCYTES # BLD AUTO: 1.3 K/UL — SIGNIFICANT CHANGE UP (ref 1–3.3)
LYMPHOCYTES # BLD AUTO: 22.9 % — SIGNIFICANT CHANGE UP (ref 13–44)
MCHC RBC-ENTMCNC: 29.2 PG — SIGNIFICANT CHANGE UP (ref 27–34)
MCHC RBC-ENTMCNC: 34.6 G/DL — SIGNIFICANT CHANGE UP (ref 32–36)
MCV RBC AUTO: 84.5 FL — SIGNIFICANT CHANGE UP (ref 80–100)
MONOCYTES # BLD AUTO: 0.66 K/UL — SIGNIFICANT CHANGE UP (ref 0–0.9)
MONOCYTES NFR BLD AUTO: 11.6 % — SIGNIFICANT CHANGE UP (ref 2–14)
NEUTROPHILS # BLD AUTO: 3.52 K/UL — SIGNIFICANT CHANGE UP (ref 1.8–7.4)
NEUTROPHILS NFR BLD AUTO: 62.2 % — SIGNIFICANT CHANGE UP (ref 43–77)
NRBC # BLD: 0 /100 WBCS — SIGNIFICANT CHANGE UP (ref 0–0)
NRBC # FLD: 0 K/UL — SIGNIFICANT CHANGE UP (ref 0–0)
PLATELET # BLD AUTO: 215 K/UL — SIGNIFICANT CHANGE UP (ref 150–400)
POTASSIUM SERPL-MCNC: 4 MMOL/L — SIGNIFICANT CHANGE UP (ref 3.5–5.3)
POTASSIUM SERPL-SCNC: 4 MMOL/L — SIGNIFICANT CHANGE UP (ref 3.5–5.3)
PROT SERPL-MCNC: 6.6 G/DL — SIGNIFICANT CHANGE UP (ref 6–8.3)
RBC # BLD: 4.52 M/UL — SIGNIFICANT CHANGE UP (ref 4.2–5.8)
RBC # FLD: 12.5 % — SIGNIFICANT CHANGE UP (ref 10.3–14.5)
RSV RNA NPH QL NAA+NON-PROBE: SIGNIFICANT CHANGE UP
SARS-COV-2 RNA SPEC QL NAA+PROBE: SIGNIFICANT CHANGE UP
SODIUM SERPL-SCNC: 142 MMOL/L — SIGNIFICANT CHANGE UP (ref 135–145)
TROPONIN T, HIGH SENSITIVITY RESULT: 30 NG/L — SIGNIFICANT CHANGE UP
WBC # BLD: 5.67 K/UL — SIGNIFICANT CHANGE UP (ref 3.8–10.5)
WBC # FLD AUTO: 5.67 K/UL — SIGNIFICANT CHANGE UP (ref 3.8–10.5)

## 2024-12-02 PROCEDURE — 99285 EMERGENCY DEPT VISIT HI MDM: CPT

## 2024-12-02 PROCEDURE — 71046 X-RAY EXAM CHEST 2 VIEWS: CPT | Mod: 26

## 2024-12-02 NOTE — ED ADULT TRIAGE NOTE - CHIEF COMPLAINT QUOTE
Pt c/o chest pain, sob, and productive cough x4 days. Pt reports telehealth appt today, was told to come into ED for r/o pneumonia. Pt denies any N/V/D, fever/chills. PMHx legally blind, diverticulitis.

## 2024-12-02 NOTE — ED ADULT NURSE NOTE - OBJECTIVE STATEMENT
Pt arrives to ED A&Ox4, ambulatory at baseline. Pt is legally blind and PMHx of diverticulitis. Pt C/O CP, SOB, and productive cough x 4 days. Pt states he had a telehealth appt today and was told to come in to R/O pneumonia. Pt denies any N, V, D, fevers/ chills. Respirations are even and unlabored, abdomen is soft and nondistended, capillary refill is 2 seconds bilaterally, 20G IV placed in right AC, labs drawn and sent. RVP collected and sent. Bed in lowest position, safety maintained. Pt arrives to ED A&Ox4, ambulatory at baseline. Pt is legally blind and PMHx of diverticulitis. Pt C/O CP, SOB, and productive cough x 4 days. Pt states he had a telehealth appt today and was told to come in to R/O pneumonia. Pt denies any N, V, D, fevers/ chills. Respirations are even and unlabored, pt placed on monitor, NSR on monitor, abdomen is soft and nondistended, capillary refill is 2 seconds bilaterally, 20G IV placed in right AC, labs drawn and sent. RVP collected and sent. Bed in lowest position, comfort measures provided, safety maintained, safety maintained. Pending lab results and xrayy.

## 2024-12-02 NOTE — ED PROVIDER NOTE - CLINICAL SUMMARY MEDICAL DECISION MAKING FREE TEXT BOX
Will check labs including cardiac enzymes and d-dimer to r/o PE as well as chest xray to r/o pneumonia. flu/covid and will reassess.

## 2024-12-02 NOTE — ED ADULT NURSE NOTE - NSFALLUNIVINTERV_ED_ALL_ED
Bed/Stretcher in lowest position, wheels locked, appropriate side rails in place/Call bell, personal items and telephone in reach/Instruct patient to call for assistance before getting out of bed/chair/stretcher/Non-slip footwear applied when patient is off stretcher/[place] to call system/Physically safe environment - no spills, clutter or unnecessary equipment/Purposeful proactive rounding/Room/bathroom lighting operational, light cord in reach

## 2024-12-02 NOTE — ED PROVIDER NOTE - OBJECTIVE STATEMENT
55 yo M with PMHx diverticulitis who is legally blind here with congestion, productive cough and progressively worsening shortness of breath for the past 4 days, The pt had a telehealth appointment today where he was told to come into the ED to r/o pneumonia vs. a pulmonary embolus. the pt says that he is not able to take a full deep breath and that he is having pleuritic pain especially of the right upper back. He says that he is not able to walk more than a few steps without becoming sob. Denies f/c, n/v, dysuria, leg swelling, orthopnea. No recent travel. No sick contacts.

## 2024-12-02 NOTE — ED PROVIDER NOTE - CPE EDP MUSC NORM
Assessment/description of ears? Intact, pink, blanching   Which preventative measures are in place for the ears?  -grey foam to oxygen tubing (O2 at night)    Assessment/description of elbows? Intact, pink, blanching   Which preventative measures are in place for the elbows?  -pt repositions self frequently     Assessment/description of sacrum? Intact, pink, blanching   Which preventative measures are in place for the sacrum?  -pt repositions self frequently     Assessment/description of heels? Dry, intact, blanching   Which preventative measures are in place for the heels?  -frequent repositioning     Which devices are in place? PIV, NC  Description of skin under devices: clean, dry, intact   Which preventative measures are in place under devices?  -grey foam to oxygen tubing    Other:   Conveyed message below to patient. Verbalized understanding. No further questions.   normal...

## 2024-12-03 ENCOUNTER — RESULT REVIEW (OUTPATIENT)
Age: 54
End: 2024-12-03

## 2024-12-03 DIAGNOSIS — H35.52 PIGMENTARY RETINAL DYSTROPHY: ICD-10-CM

## 2024-12-03 DIAGNOSIS — K57.90 DIVERTICULOSIS OF INTESTINE, PART UNSPECIFIED, WITHOUT PERFORATION OR ABSCESS WITHOUT BLEEDING: ICD-10-CM

## 2024-12-03 DIAGNOSIS — Z29.9 ENCOUNTER FOR PROPHYLACTIC MEASURES, UNSPECIFIED: ICD-10-CM

## 2024-12-03 DIAGNOSIS — I26.99 OTHER PULMONARY EMBOLISM WITHOUT ACUTE COR PULMONALE: ICD-10-CM

## 2024-12-03 DIAGNOSIS — K57.92 DIVERTICULITIS OF INTESTINE, PART UNSPECIFIED, WITHOUT PERFORATION OR ABSCESS WITHOUT BLEEDING: ICD-10-CM

## 2024-12-03 DIAGNOSIS — Z86.69 PERSONAL HISTORY OF OTHER DISEASES OF THE NERVOUS SYSTEM AND SENSE ORGANS: ICD-10-CM

## 2024-12-03 DIAGNOSIS — Z87.19 PERSONAL HISTORY OF OTHER DISEASES OF THE DIGESTIVE SYSTEM: ICD-10-CM

## 2024-12-03 DIAGNOSIS — Q63.1 LOBULATED, FUSED AND HORSESHOE KIDNEY: ICD-10-CM

## 2024-12-03 LAB
ALBUMIN SERPL ELPH-MCNC: 3.6 G/DL — SIGNIFICANT CHANGE UP (ref 3.3–5)
ALP SERPL-CCNC: 68 U/L — SIGNIFICANT CHANGE UP (ref 40–120)
ALT FLD-CCNC: 17 U/L — SIGNIFICANT CHANGE UP (ref 4–41)
ANION GAP SERPL CALC-SCNC: 15 MMOL/L — HIGH (ref 7–14)
APTT BLD: 33.5 SEC — SIGNIFICANT CHANGE UP (ref 24.5–35.6)
APTT BLD: 76 SEC — HIGH (ref 24.5–35.6)
APTT BLD: 78.9 SEC — HIGH (ref 24.5–35.6)
AST SERPL-CCNC: 21 U/L — SIGNIFICANT CHANGE UP (ref 4–40)
AT III ACT/NOR PPP CHRO: 71 % — LOW (ref 85–135)
BASOPHILS # BLD AUTO: 0.03 K/UL — SIGNIFICANT CHANGE UP (ref 0–0.2)
BASOPHILS NFR BLD AUTO: 0.6 % — SIGNIFICANT CHANGE UP (ref 0–2)
BILIRUB SERPL-MCNC: 0.4 MG/DL — SIGNIFICANT CHANGE UP (ref 0.2–1.2)
BLD GP AB SCN SERPL QL: NEGATIVE — SIGNIFICANT CHANGE UP
BUN SERPL-MCNC: 15 MG/DL — SIGNIFICANT CHANGE UP (ref 7–23)
CALCIUM SERPL-MCNC: 9.1 MG/DL — SIGNIFICANT CHANGE UP (ref 8.4–10.5)
CHLORIDE SERPL-SCNC: 103 MMOL/L — SIGNIFICANT CHANGE UP (ref 98–107)
CO2 SERPL-SCNC: 23 MMOL/L — SIGNIFICANT CHANGE UP (ref 22–31)
CREAT SERPL-MCNC: 0.76 MG/DL — SIGNIFICANT CHANGE UP (ref 0.5–1.3)
EGFR: 108 ML/MIN/1.73M2 — SIGNIFICANT CHANGE UP
EOSINOPHIL # BLD AUTO: 0.24 K/UL — SIGNIFICANT CHANGE UP (ref 0–0.5)
EOSINOPHIL NFR BLD AUTO: 4.9 % — SIGNIFICANT CHANGE UP (ref 0–6)
GLUCOSE SERPL-MCNC: 106 MG/DL — HIGH (ref 70–99)
HCT VFR BLD CALC: 37.5 % — LOW (ref 39–50)
HCT VFR BLD CALC: 39.7 % — SIGNIFICANT CHANGE UP (ref 39–50)
HGB BLD-MCNC: 12.4 G/DL — LOW (ref 13–17)
HGB BLD-MCNC: 13.3 G/DL — SIGNIFICANT CHANGE UP (ref 13–17)
IANC: 2.51 K/UL — SIGNIFICANT CHANGE UP (ref 1.8–7.4)
IMM GRANULOCYTES NFR BLD AUTO: 0 % — SIGNIFICANT CHANGE UP (ref 0–0.9)
INR BLD: 1.03 RATIO — SIGNIFICANT CHANGE UP (ref 0.85–1.16)
LYMPHOCYTES # BLD AUTO: 1.54 K/UL — SIGNIFICANT CHANGE UP (ref 1–3.3)
LYMPHOCYTES # BLD AUTO: 31.7 % — SIGNIFICANT CHANGE UP (ref 13–44)
MAGNESIUM SERPL-MCNC: 2.1 MG/DL — SIGNIFICANT CHANGE UP (ref 1.6–2.6)
MCHC RBC-ENTMCNC: 28.2 PG — SIGNIFICANT CHANGE UP (ref 27–34)
MCHC RBC-ENTMCNC: 28.7 PG — SIGNIFICANT CHANGE UP (ref 27–34)
MCHC RBC-ENTMCNC: 33.1 G/DL — SIGNIFICANT CHANGE UP (ref 32–36)
MCHC RBC-ENTMCNC: 33.5 G/DL — SIGNIFICANT CHANGE UP (ref 32–36)
MCV RBC AUTO: 85.2 FL — SIGNIFICANT CHANGE UP (ref 80–100)
MCV RBC AUTO: 85.7 FL — SIGNIFICANT CHANGE UP (ref 80–100)
MONOCYTES # BLD AUTO: 0.54 K/UL — SIGNIFICANT CHANGE UP (ref 0–0.9)
MONOCYTES NFR BLD AUTO: 11.1 % — SIGNIFICANT CHANGE UP (ref 2–14)
NEUTROPHILS # BLD AUTO: 2.51 K/UL — SIGNIFICANT CHANGE UP (ref 1.8–7.4)
NEUTROPHILS NFR BLD AUTO: 51.7 % — SIGNIFICANT CHANGE UP (ref 43–77)
NRBC # BLD: 0 /100 WBCS — SIGNIFICANT CHANGE UP (ref 0–0)
NRBC # BLD: 0 /100 WBCS — SIGNIFICANT CHANGE UP (ref 0–0)
NRBC # FLD: 0 K/UL — SIGNIFICANT CHANGE UP (ref 0–0)
NRBC # FLD: 0 K/UL — SIGNIFICANT CHANGE UP (ref 0–0)
NT-PROBNP SERPL-SCNC: 98 PG/ML — SIGNIFICANT CHANGE UP
PHOSPHATE SERPL-MCNC: 3.3 MG/DL — SIGNIFICANT CHANGE UP (ref 2.5–4.5)
PLATELET # BLD AUTO: 188 K/UL — SIGNIFICANT CHANGE UP (ref 150–400)
PLATELET # BLD AUTO: 201 K/UL — SIGNIFICANT CHANGE UP (ref 150–400)
POTASSIUM SERPL-MCNC: 3.6 MMOL/L — SIGNIFICANT CHANGE UP (ref 3.5–5.3)
POTASSIUM SERPL-SCNC: 3.6 MMOL/L — SIGNIFICANT CHANGE UP (ref 3.5–5.3)
PROT SERPL-MCNC: 6.8 G/DL — SIGNIFICANT CHANGE UP (ref 6–8.3)
PROTHROM AB SERPL-ACNC: 11.9 SEC — SIGNIFICANT CHANGE UP (ref 9.9–13.4)
RBC # BLD: 4.4 M/UL — SIGNIFICANT CHANGE UP (ref 4.2–5.8)
RBC # BLD: 4.63 M/UL — SIGNIFICANT CHANGE UP (ref 4.2–5.8)
RBC # FLD: 12.8 % — SIGNIFICANT CHANGE UP (ref 10.3–14.5)
RBC # FLD: 12.8 % — SIGNIFICANT CHANGE UP (ref 10.3–14.5)
RH IG SCN BLD-IMP: POSITIVE — SIGNIFICANT CHANGE UP
SODIUM SERPL-SCNC: 141 MMOL/L — SIGNIFICANT CHANGE UP (ref 135–145)
WBC # BLD: 4.59 K/UL — SIGNIFICANT CHANGE UP (ref 3.8–10.5)
WBC # BLD: 4.86 K/UL — SIGNIFICANT CHANGE UP (ref 3.8–10.5)
WBC # FLD AUTO: 4.59 K/UL — SIGNIFICANT CHANGE UP (ref 3.8–10.5)
WBC # FLD AUTO: 4.86 K/UL — SIGNIFICANT CHANGE UP (ref 3.8–10.5)

## 2024-12-03 PROCEDURE — 93306 TTE W/DOPPLER COMPLETE: CPT | Mod: 26

## 2024-12-03 PROCEDURE — 71275 CT ANGIOGRAPHY CHEST: CPT | Mod: 26,MC

## 2024-12-03 PROCEDURE — 74177 CT ABD & PELVIS W/CONTRAST: CPT | Mod: 26

## 2024-12-03 PROCEDURE — 93356 MYOCRD STRAIN IMG SPCKL TRCK: CPT

## 2024-12-03 PROCEDURE — 93970 EXTREMITY STUDY: CPT | Mod: 26

## 2024-12-03 PROCEDURE — G0452: CPT | Mod: 26

## 2024-12-03 PROCEDURE — 99223 1ST HOSP IP/OBS HIGH 75: CPT | Mod: GC

## 2024-12-03 RX ORDER — HEPARIN SODIUM,PORCINE 1000/ML
7000 VIAL (ML) INJECTION EVERY 6 HOURS
Refills: 0 | Status: DISCONTINUED | OUTPATIENT
Start: 2024-12-03 | End: 2024-12-04

## 2024-12-03 RX ORDER — ACETAMINOPHEN, DIPHENHYDRAMINE HCL, PHENYLEPHRINE HCL 325; 25; 5 MG/1; MG/1; MG/1
3 TABLET ORAL AT BEDTIME
Refills: 0 | Status: DISCONTINUED | OUTPATIENT
Start: 2024-12-03 | End: 2024-12-04

## 2024-12-03 RX ORDER — ACETAMINOPHEN 500MG 500 MG/1
1000 TABLET, COATED ORAL ONCE
Refills: 0 | Status: COMPLETED | OUTPATIENT
Start: 2024-12-03 | End: 2024-12-03

## 2024-12-03 RX ORDER — HEPARIN SODIUM,PORCINE 1000/ML
3500 VIAL (ML) INJECTION EVERY 6 HOURS
Refills: 0 | Status: DISCONTINUED | OUTPATIENT
Start: 2024-12-03 | End: 2024-12-04

## 2024-12-03 RX ORDER — MAGNESIUM, ALUMINUM HYDROXIDE 200-225/5
30 SUSPENSION, ORAL (FINAL DOSE FORM) ORAL EVERY 4 HOURS
Refills: 0 | Status: DISCONTINUED | OUTPATIENT
Start: 2024-12-03 | End: 2024-12-04

## 2024-12-03 RX ORDER — VITAMIN A PALMITATE 15 MG/ML
1 INJECTION, SOLUTION INTRAMUSCULAR
Refills: 0 | DISCHARGE

## 2024-12-03 RX ORDER — HEPARIN SODIUM,PORCINE 1000/ML
8500 VIAL (ML) INJECTION ONCE
Refills: 0 | Status: COMPLETED | OUTPATIENT
Start: 2024-12-03 | End: 2024-12-03

## 2024-12-03 RX ORDER — ACETAMINOPHEN 500MG 500 MG/1
650 TABLET, COATED ORAL EVERY 6 HOURS
Refills: 0 | Status: DISCONTINUED | OUTPATIENT
Start: 2024-12-03 | End: 2024-12-04

## 2024-12-03 RX ORDER — HEPARIN SODIUM,PORCINE 1000/ML
VIAL (ML) INJECTION
Qty: 25000 | Refills: 0 | Status: DISCONTINUED | OUTPATIENT
Start: 2024-12-03 | End: 2024-12-03

## 2024-12-03 RX ORDER — ONDANSETRON HYDROCHLORIDE 4 MG/1
4 TABLET, FILM COATED ORAL EVERY 8 HOURS
Refills: 0 | Status: DISCONTINUED | OUTPATIENT
Start: 2024-12-03 | End: 2024-12-04

## 2024-12-03 RX ORDER — HEPARIN SODIUM,PORCINE 1000/ML
8500 VIAL (ML) INJECTION EVERY 6 HOURS
Refills: 0 | Status: DISCONTINUED | OUTPATIENT
Start: 2024-12-03 | End: 2024-12-03

## 2024-12-03 RX ORDER — HEPARIN SODIUM,PORCINE 1000/ML
4000 VIAL (ML) INJECTION EVERY 6 HOURS
Refills: 0 | Status: DISCONTINUED | OUTPATIENT
Start: 2024-12-03 | End: 2024-12-03

## 2024-12-03 RX ORDER — HEPARIN SODIUM,PORCINE 1000/ML
1600 VIAL (ML) INJECTION
Qty: 25000 | Refills: 0 | Status: DISCONTINUED | OUTPATIENT
Start: 2024-12-03 | End: 2024-12-04

## 2024-12-03 RX ADMIN — Medication 1600 UNIT(S)/HR: at 06:35

## 2024-12-03 RX ADMIN — ACETAMINOPHEN 500MG 400 MILLIGRAM(S): 500 TABLET, COATED ORAL at 11:28

## 2024-12-03 RX ADMIN — Medication 1600 UNIT(S)/HR: at 13:58

## 2024-12-03 RX ADMIN — Medication UNIT(S)/HR: at 06:00

## 2024-12-03 RX ADMIN — ACETAMINOPHEN 500MG 1000 MILLIGRAM(S): 500 TABLET, COATED ORAL at 12:00

## 2024-12-03 RX ADMIN — Medication 1800 UNIT(S)/HR: at 05:54

## 2024-12-03 RX ADMIN — Medication 8500 UNIT(S): at 05:54

## 2024-12-03 RX ADMIN — Medication 1600 UNIT(S)/HR: at 20:22

## 2024-12-03 RX ADMIN — Medication 1600 UNIT(S)/HR: at 21:05

## 2024-12-03 NOTE — H&P ADULT - NSHPREVIEWOFSYSTEMS_GEN_ALL_CORE
REVIEW OF SYSTEMS:  Constitutional: [ ] fevers [ ] chills [ ] weight loss [ ] weight gain  HEENT: [ ] headache [ ] dizziness [ ] vertigo [ ] blurry vision[ ] double vision [ ] rhinorrhea [ ] nasal congestion [ ] sore throat  CV: [x] chest pain - on the right side of ribs [ ] orthopnea [ ] palpitations  Resp: [ ] cough [x] shortness of breath - when walking more than 30 steps [ ] dyspnea [ ] wheezing [ ] sputum [ ] hemoptysis  GI: [ ] nausea [ ] vomiting [ ] diarrhea [ ] constipation [ ] abd pain [ ] dysphagia [ ] hemetemesis [ ] melena [ ] hematochezia  : [ ] dysuria [ ] nocturia [ ] hematuria [ ] urinary frequency [ ] urinary urgency  Musculoskeletal: [ ] back pain [ ] myalgias [ ] arthralgias  Skin: [ ] rash [ ] itch  Neurological: [ ] headache [ ] dizziness [ ] syncope [ ] weakness [ ] numbness  Hematologic/Lymphatic: [ ] anemia [ ] bleeding problem  [ ] Unable to assess ROS because ________  Other Review of Systems: All other review of systems negative, except as noted in HPI

## 2024-12-03 NOTE — H&P ADULT - PROBLEM SELECTOR PLAN 5
Code Status: FULL CODE  DVT PPx: heparin gtt  E: replete K<4, Mg<2  Diet: regular high fiber  Dispo: pending medical improvement

## 2024-12-03 NOTE — PATIENT PROFILE ADULT - FALL HARM RISK - DEVICES
Called and spoke with patient regarding Dr. Cherry's message below. Patient verbalized understanding.    Assisted patient in scheduling his C{E in February with Dr. Cherry. Patient is wondering if Dr. Cherry would want him to complete any labs prior to that appointment in February.     None

## 2024-12-03 NOTE — H&P ADULT - PROBLEM SELECTOR PLAN 3
Found to have horseshoe kidney during initial work-up of diverticulitis - asymptomatic prior.  Found to have  severe R hydro and cortical thinning. Has 7mm RLP stone and UPJ obstruction   10/21 - RAL R pyeloplasty and stent placement  No episodes of pyelo.    PLAN  - NTD   - follow-up outpatient for annual US

## 2024-12-03 NOTE — ED ADULT NURSE REASSESSMENT NOTE - NS ED NURSE REASSESS COMMENT FT1
Pt lying in stretcher, not in acute distress, respirations are even and unlabored, NSR on monitor, 22G IV placed in left hand, labs drawn and sent. Repeat weight obtained and updated in chart of 86.5 kg. Heparin infusion stopped until dosing is corrected by MD Milton. Vitals as charted. Bed in lowest position, comfort measures provided, safety maintained.

## 2024-12-03 NOTE — H&P ADULT - NSHPLABSRESULTS_GEN_ALL_CORE
LABS:                         13.2   5.67  )-----------( 215      ( 02 Dec 2024 22:00 )             38.2     12-02    142  |  104  |  14  ----------------------------<  103[H]  4.0   |  25  |  0.89    Ca    9.1      02 Dec 2024 22:00    TPro  6.6  /  Alb  3.7  /  TBili  0.5  /  DBili  x   /  AST  23  /  ALT  17  /  AlkPhos  64  12-02    PT/INR - ( 03 Dec 2024 05:53 )   PT: 11.9 sec;   INR: 1.03 ratio         PTT - ( 03 Dec 2024 05:53 )  PTT:33.5 sec  Urinalysis Basic - ( 02 Dec 2024 22:00 )    Color: x / Appearance: x / SG: x / pH: x  Gluc: 103 mg/dL / Ketone: x  / Bili: x / Urobili: x   Blood: x / Protein: x / Nitrite: x   Leuk Esterase: x / RBC: x / WBC x   Sq Epi: x / Non Sq Epi: x / Bacteria: x                RADIOLOGY, EKG & ADDITIONAL TESTS:

## 2024-12-03 NOTE — H&P ADULT - NSHPSOCIALHISTORY_GEN_ALL_CORE
Patient is a 55 yo male lives with his wife. He worked as an  but can no longer due to vision loss. He walks with a guide cane. He has three children who are all in college of graduate school.

## 2024-12-03 NOTE — H&P ADULT - ASSESSMENT
Patient is a 53 yo male with PMHx of right horseshoe kidney s/p  R pyeloplasty in 2021, retinitis pigmentosa c/b cataract, recurrent diverticulitis presenting with acute bilateral segmental and subsegmental PE. No sign of right heart strain. Hemodynamically stable. Currently on heparin GTT with plan to transition to DOAC.

## 2024-12-03 NOTE — PATIENT PROFILE ADULT - FALL HARM RISK - HARM RISK INTERVENTIONS

## 2024-12-03 NOTE — H&P ADULT - NSHPPHYSICALEXAM_GEN_ALL_CORE
GENERAL: NAD, lying in bed comfortably  HEAD:  Atraumatic, Normocephalic  EYES: +vision loss. No scleral icterus and no conjunctival injection.  ENT: Moist mucous membranes  NECK: Supple, No JVD  CHEST/LUNG: Clear to auscultation bilaterally; No rales, rhonchi, wheezing, or rubs. Unlabored respirations  HEART: Regular rate and rhythm; No murmurs, rubs, or gallops  ABDOMEN: BS +; Soft, nontender, nondistended  EXTREMITIES:  2+ Peripheral Pulses, brisk capillary refill. No clubbing, cyanosis, or edema  NERVOUS SYSTEM:  A&Ox3, no focal neurological deficits. CN II-XII grossly intact, but not individually tested.  PSYCHIATRIC: Cooperative. Appropriate mood and affect.  SKIN: Dry, intact, No rashes or lesions Vital Signs Last 24 Hrs  T(C): 37.2 (03 Dec 2024 09:40), Max: 37.5 (02 Dec 2024 19:45)  T(F): 99 (03 Dec 2024 09:40), Max: 99.5 (02 Dec 2024 19:45)  HR: 76 (03 Dec 2024 09:40) (76 - 106)  BP: 113/80 (03 Dec 2024 09:40) (113/80 - 141/83)  BP(mean): 93 (03 Dec 2024 08:40) (93 - 93)  RR: 18 (03 Dec 2024 09:40) (16 - 18)  SpO2: 98% (03 Dec 2024 09:40) (97% - 100%)    Parameters below as of 03 Dec 2024 09:40  Patient On (Oxygen Delivery Method): room air        GENERAL: NAD, lying in bed comfortably  HEAD:  Atraumatic, Normocephalic  EYES: +vision loss. No scleral icterus and no conjunctival injection.  ENT: Moist mucous membranes  NECK: Supple, No JVD  CHEST/LUNG: Clear to auscultation bilaterally; No rales, rhonchi, wheezing, or rubs. Unlabored respirations  HEART: Regular rate and rhythm; No murmurs, rubs, or gallops  ABDOMEN: BS +; Soft, nontender, nondistended  EXTREMITIES:  2+ Peripheral Pulses, brisk capillary refill. No clubbing, cyanosis, or edema  NERVOUS SYSTEM:  A&Ox3, no focal neurological deficits. CN II-XII grossly intact, but not individually tested.  PSYCHIATRIC: Cooperative. Appropriate mood and affect.  SKIN: Dry, intact, No rashes or lesions

## 2024-12-03 NOTE — H&P ADULT - PROBLEM SELECTOR PLAN 2
Diagnosed with diverticulitis when he was 39. Started on a course of cipro for diverticulitis about 3 weeks prior to current admission.     Follows with Dr. Connor Cox    PLAN  - high fiber diet

## 2024-12-03 NOTE — H&P ADULT - ATTENDING COMMENTS
54M Retinitis pigmentosa - legally blind, horseshoe kidney s/p Rt pyeloplasty, recurrent diverticulitis p/w acute PE.    - acute PE - diagnosed on CTPA; monitor O2 status while ambulating; TTE pend to r/o Cor pulmonale; check hypercoagulable w/u - CT of A/P pend, hypercoagulable blood work pending; LE doppler pend; PT/OT eval for safe disposition; Eliquis for A/C

## 2024-12-03 NOTE — H&P ADULT - PROBLEM SELECTOR PLAN 1
Presented with progressive dyspnea and pleuritic chest pain.  No prolonged immobility, no history of cancer, no history of autoimmune disease. Positive history of recurrent diverticulitis    Ct Angio chest - Extensive acute bilateral segmental and subsegmental PE. No CT evidence   of right heart strain. Small right pleural effusion. Small wedge-shaped right basilar consolidation, favoring pulmonary   infarct versus atelectasis.    No signs of right heart strain. Hemodynamically stable    PESI Score - 62 point s class 1, very low risk 0-1.6% 30 day mortality    PLAN  TTE  Heparin gtt transition to DOAC when able  admit to telemetry + pulse ox  BL duplex to evaluate for DVT  CT A/P w/ contrast for malignancy r/o  pain - IV tylenol Presented with progressive dyspnea and pleuritic chest pain.  No prolonged immobility, no history of cancer, no history of autoimmune disease. Positive history of recurrent diverticulitis    Ct Angio chest - Extensive acute bilateral segmental and subsegmental PE. No CT evidence   of right heart strain. Small right pleural effusion. Small wedge-shaped right basilar consolidation, favoring pulmonary   infarct versus atelectasis.    No signs of right heart strain. Hemodynamically stable    PESI Score - 62 point s class 1, very low risk 0-1.6% 30 day mortality    PLAN  TTE - c/f right strain  Heparin gtt transition to DOAC when able  admit to telemetry + pulse ox  BL duplex to evaluate for DVT  CT A/P w/ contrast for malignancy r/o  hypercoagulable labs  PT eval   pain - IV tylenol

## 2024-12-03 NOTE — H&P ADULT - PROBLEM SELECTOR PLAN 4
Loss of vision beginning at 39 complicated by cataract    Patient walks with guide cane    PLAN  - Fall precautions  - NTD

## 2024-12-03 NOTE — H&P ADULT - HISTORY OF PRESENT ILLNESS
Patient is a 55 yo male with PMHx of right horseshoe kidney s/p  R pyeloplasty in 2021, vision loss 2/2 retinitis pigmentosa c/b cataract, recurrent diverticulitis presenting with worsening shortness of breath. Patient noted that he was having SOB, pleuritic chest, and unable to walk 30 steps on Sunday. Prior to that he had no periods of extended immobility, no history of cancer, no history of autoimmune disease. Patient did not notice any palpable firm veins on lower extremity. Patient seen by telehealth doctor on Monday and advised to come to the ED du to concern for PNA v PE. Prior to this episode patient completed a course of cipro due to diverticulitis flare. No other acute changes in medical history prior to onset of SOB    In ED patient was hemodynamically stable. CT angio demonstrated extensive acute bilateral segmental and subsegmental PE. No signs of right heart strain. Started on heparin gtt in ED.

## 2024-12-04 ENCOUNTER — TRANSCRIPTION ENCOUNTER (OUTPATIENT)
Age: 54
End: 2024-12-04

## 2024-12-04 VITALS — HEART RATE: 110 BPM | OXYGEN SATURATION: 96 % | RESPIRATION RATE: 18 BRPM

## 2024-12-04 DIAGNOSIS — I82.409 ACUTE EMBOLISM AND THROMBOSIS OF UNSPECIFIED DEEP VEINS OF UNSPECIFIED LOWER EXTREMITY: ICD-10-CM

## 2024-12-04 LAB
ALBUMIN SERPL ELPH-MCNC: 3.2 G/DL — LOW (ref 3.3–5)
ALP SERPL-CCNC: 60 U/L — SIGNIFICANT CHANGE UP (ref 40–120)
ALT FLD-CCNC: 17 U/L — SIGNIFICANT CHANGE UP (ref 4–41)
ANION GAP SERPL CALC-SCNC: 11 MMOL/L — SIGNIFICANT CHANGE UP (ref 7–14)
APTT BLD: 70.5 SEC — HIGH (ref 24.5–35.6)
AST SERPL-CCNC: 19 U/L — SIGNIFICANT CHANGE UP (ref 4–40)
B2 GLYCOPROT1 IGA SER QL: <2 U/ML — SIGNIFICANT CHANGE UP
B2 GLYCOPROT1 IGG SER-ACNC: <1.4 U/ML — SIGNIFICANT CHANGE UP
B2 GLYCOPROT1 IGM SER-ACNC: <1.5 U/ML — SIGNIFICANT CHANGE UP
BASOPHILS # BLD AUTO: 0.02 K/UL — SIGNIFICANT CHANGE UP (ref 0–0.2)
BASOPHILS NFR BLD AUTO: 0.6 % — SIGNIFICANT CHANGE UP (ref 0–2)
BILIRUB SERPL-MCNC: 0.4 MG/DL — SIGNIFICANT CHANGE UP (ref 0.2–1.2)
BUN SERPL-MCNC: 14 MG/DL — SIGNIFICANT CHANGE UP (ref 7–23)
CALCIUM SERPL-MCNC: 8.9 MG/DL — SIGNIFICANT CHANGE UP (ref 8.4–10.5)
CARDIOLIPIN IGM SER-MCNC: <1.5 MPL U/ML — SIGNIFICANT CHANGE UP
CARDIOLIPIN IGM SER-MCNC: <1.6 GPL U/ML — SIGNIFICANT CHANGE UP
CHLORIDE SERPL-SCNC: 106 MMOL/L — SIGNIFICANT CHANGE UP (ref 98–107)
CO2 SERPL-SCNC: 24 MMOL/L — SIGNIFICANT CHANGE UP (ref 22–31)
CREAT SERPL-MCNC: 0.77 MG/DL — SIGNIFICANT CHANGE UP (ref 0.5–1.3)
DEPRECATED CARDIOLIPIN IGA SER: <2 APL U/ML — SIGNIFICANT CHANGE UP
EGFR: 108 ML/MIN/1.73M2 — SIGNIFICANT CHANGE UP
EOSINOPHIL # BLD AUTO: 0.19 K/UL — SIGNIFICANT CHANGE UP (ref 0–0.5)
EOSINOPHIL NFR BLD AUTO: 5.4 % — SIGNIFICANT CHANGE UP (ref 0–6)
GLUCOSE SERPL-MCNC: 98 MG/DL — SIGNIFICANT CHANGE UP (ref 70–99)
HCT VFR BLD CALC: 38.4 % — LOW (ref 39–50)
HCYS SERPL-MCNC: 11.7 UMOL/L — SIGNIFICANT CHANGE UP
HGB BLD-MCNC: 12.8 G/DL — LOW (ref 13–17)
IANC: 1.55 K/UL — LOW (ref 1.8–7.4)
IMM GRANULOCYTES NFR BLD AUTO: 0.3 % — SIGNIFICANT CHANGE UP (ref 0–0.9)
LYMPHOCYTES # BLD AUTO: 1.37 K/UL — SIGNIFICANT CHANGE UP (ref 1–3.3)
LYMPHOCYTES # BLD AUTO: 38.7 % — SIGNIFICANT CHANGE UP (ref 13–44)
MAGNESIUM SERPL-MCNC: 2.2 MG/DL — SIGNIFICANT CHANGE UP (ref 1.6–2.6)
MCHC RBC-ENTMCNC: 28.6 PG — SIGNIFICANT CHANGE UP (ref 27–34)
MCHC RBC-ENTMCNC: 33.3 G/DL — SIGNIFICANT CHANGE UP (ref 32–36)
MCV RBC AUTO: 85.9 FL — SIGNIFICANT CHANGE UP (ref 80–100)
MONOCYTES # BLD AUTO: 0.4 K/UL — SIGNIFICANT CHANGE UP (ref 0–0.9)
MONOCYTES NFR BLD AUTO: 11.3 % — SIGNIFICANT CHANGE UP (ref 2–14)
NEUTROPHILS # BLD AUTO: 1.55 K/UL — LOW (ref 1.8–7.4)
NEUTROPHILS NFR BLD AUTO: 43.7 % — SIGNIFICANT CHANGE UP (ref 43–77)
NRBC # BLD: 0 /100 WBCS — SIGNIFICANT CHANGE UP (ref 0–0)
NRBC # FLD: 0 K/UL — SIGNIFICANT CHANGE UP (ref 0–0)
PHOSPHATE SERPL-MCNC: 3.7 MG/DL — SIGNIFICANT CHANGE UP (ref 2.5–4.5)
PLATELET # BLD AUTO: 186 K/UL — SIGNIFICANT CHANGE UP (ref 150–400)
POTASSIUM SERPL-MCNC: 3.6 MMOL/L — SIGNIFICANT CHANGE UP (ref 3.5–5.3)
POTASSIUM SERPL-SCNC: 3.6 MMOL/L — SIGNIFICANT CHANGE UP (ref 3.5–5.3)
PROT C ACT/NOR PPP: 90 % — SIGNIFICANT CHANGE UP (ref 74–150)
PROT S FREE PPP-ACNC: 76 % — SIGNIFICANT CHANGE UP (ref 70–130)
PROT SERPL-MCNC: 6.2 G/DL — SIGNIFICANT CHANGE UP (ref 6–8.3)
RBC # BLD: 4.47 M/UL — SIGNIFICANT CHANGE UP (ref 4.2–5.8)
RBC # FLD: 12.7 % — SIGNIFICANT CHANGE UP (ref 10.3–14.5)
SODIUM SERPL-SCNC: 141 MMOL/L — SIGNIFICANT CHANGE UP (ref 135–145)
WBC # BLD: 3.54 K/UL — LOW (ref 3.8–10.5)
WBC # FLD AUTO: 3.54 K/UL — LOW (ref 3.8–10.5)

## 2024-12-04 PROCEDURE — 99233 SBSQ HOSP IP/OBS HIGH 50: CPT | Mod: GC

## 2024-12-04 RX ORDER — APIXABAN 2.5 MG/1
10 TABLET, FILM COATED ORAL ONCE
Refills: 0 | Status: COMPLETED | OUTPATIENT
Start: 2024-12-04 | End: 2024-12-04

## 2024-12-04 RX ORDER — APIXABAN 2.5 MG/1
2 TABLET, FILM COATED ORAL
Qty: 0 | Refills: 0 | DISCHARGE
Start: 2024-12-04

## 2024-12-04 RX ORDER — APIXABAN 2.5 MG/1
2 TABLET, FILM COATED ORAL
Qty: 90 | Refills: 3
Start: 2024-12-04 | End: 2024-12-31

## 2024-12-04 RX ADMIN — ACETAMINOPHEN 500MG 650 MILLIGRAM(S): 500 TABLET, COATED ORAL at 08:41

## 2024-12-04 RX ADMIN — Medication 1600 UNIT(S)/HR: at 07:41

## 2024-12-04 RX ADMIN — ACETAMINOPHEN 500MG 650 MILLIGRAM(S): 500 TABLET, COATED ORAL at 07:41

## 2024-12-04 RX ADMIN — Medication 1600 UNIT(S)/HR: at 07:30

## 2024-12-04 RX ADMIN — APIXABAN 10 MILLIGRAM(S): 2.5 TABLET, FILM COATED ORAL at 17:18

## 2024-12-04 NOTE — PHYSICAL THERAPY INITIAL EVALUATION ADULT - GENERAL OBSERVATIONS, REHAB EVAL
Patient found semi-reclined in bed NAD, A&Ox4, +tele +heparin IV, walking stick at bedside, OK for PT per RN Arya, spO2 100% on room air

## 2024-12-04 NOTE — DISCHARGE NOTE PROVIDER - HOSPITAL COURSE
Patient is a 53 yo male with PMHx of right horseshoe kidney s/p  R pyeloplasty in 2021, retinitis pigmentosa c/b cataract, recurrent diverticulitis presenting with acute bilateral segmental and subsegmental PE. No sign of right heart strain.    HPI:  Patient is a 53 yo male with PMHx of right horseshoe kidney s/p  R pyeloplasty in 2021, vision loss 2/2 retinitis pigmentosa c/b cataract, recurrent diverticulitis presenting with worsening shortness of breath. Patient noted that he was having SOB, pleuritic chest, and unable to walk 30 steps on Sunday. Prior to that he had no periods of extended immobility, no history of cancer, no history of autoimmune disease. Patient did not notice any palpable firm veins on lower extremity. Patient seen by telehealth doctor on Monday and advised to come to the ED du to concern for PNA v PE. Prior to this episode patient completed a course of cipro due to diverticulitis flare. No other acute changes in medical history prior to onset of SOB    In ED patient was hemodynamically stable. CT angio demonstrated extensive acute bilateral segmental and subsegmental PE. No signs of right heart strain. Started on heparin gtt in ED.      (03 Dec 2024 12:38)    Hospital Course:  Patient presented with two days of shortness of breath and pleuritic chest pain. CT Angio was showed extensive acute bilateral segmental and subsegmental PE. Duplex Patient was started on a heparin gtt in the ED. VA Duplex LE showed BL acute thrombus in posterior tibial veins. TTE showed no signs of right heart strain. Patient was hemodynamically stable throughout entire admission. Work-up was sent for hypercoagulability. Patient should start Eliquis tonight and follow up with hematology for further work up of hypercoagulability and DOAC management.     On day of discharge, patient is clinically stable with no new exam findings or acute symptoms compared to prior. The patient was seen by the attending physician on the date of discharge and deemed stable and acceptable for discharge. The patient's chronic medical conditions were treated accordingly per the patient's home medication regimen. The patient's medication reconciliation (with changes made to chronic medications), follow up appointments, discharge orders, instructions, and significant lab and diagnostic studies are as noted.       Important Medication Changes and Reason:  Eliquis    Active or Pending Issues Requiring Follow-up:  [ ] f/u with hematology to evaluate     Advanced Directives:   [X] Full code  [ ] DNR  [ ] Hospice    Discharge Diagnoses:  Pulmonary Embolism  BL DVT in Lower extremity posterior tibial vein

## 2024-12-04 NOTE — DISCHARGE NOTE PROVIDER - NSDCFUSCHEDAPPT_GEN_ALL_CORE_FT
Kayley Cee  [facility] Physician [facility]  UROLOGY [address] [facility]  Scheduled Appointment: 12/11/2024

## 2024-12-04 NOTE — PROGRESS NOTE ADULT - PROBLEM SELECTOR PLAN 4
Loss of vision beginning at 39 complicated by cataract    Patient walks with guide cane    PLAN  - Fall precautions  - NTD Found to have horseshoe kidney during initial work-up of diverticulitis - asymptomatic prior.  Found to have  severe R hydro and cortical thinning. Has 7mm RLP stone and UPJ obstruction   10/21 - RAL R pyeloplasty and stent placement  No episodes of pyelo.    PLAN  - NTD   - follow-up outpatient for annual US

## 2024-12-04 NOTE — PROGRESS NOTE ADULT - PROBLEM SELECTOR PLAN 5
Code Status: FULL CODE  DVT PPx: heparin gtt  E: replete K<4, Mg<2  Diet: regular high fiber  Dispo: pending medical improvement Loss of vision beginning at 39 complicated by cataract    Patient walks with guide cane    PLAN  - Fall precautions  - NTD

## 2024-12-04 NOTE — PROGRESS NOTE ADULT - PROBLEM SELECTOR PLAN 1
Presented with progressive dyspnea and pleuritic chest pain.  No prolonged immobility, no history of cancer, no history of autoimmune disease. Positive history of recurrent diverticulitis    Ct Angio chest - Extensive acute bilateral segmental and subsegmental PE. No CT evidence   of right heart strain. Small right pleural effusion. Small wedge-shaped right basilar consolidation, favoring pulmonary   infarct versus atelectasis.    No signs of right heart strain. Hemodynamically stable    PESI Score - 62 point s class 1, very low risk 0-1.6% 30 day mortality    PLAN  TTE - c/f right strain  Heparin gtt transition to DOAC when able  admit to telemetry + pulse ox  BL duplex to evaluate for DVT  CT A/P w/ contrast for malignancy r/o  hypercoagulable labs  PT eval   pain - IV tylenol - Presented with progressive dyspnea and pleuritic chest pain.  - No prolonged immobility, no history of cancer, no history of autoimmune disease. Positive history of recurrent diverticulitis  - Ct Angio chest - Extensive acute bilateral segmental and subsegmental PE. No CT evidence   of right heart strain. Small right pleural effusion. Small wedge-shaped right basilar consolidation, favoring pulmonary infarct versus atelectasis.  - TTE shows no right heart strain. Hemodynamically stable  - CT A/p shows no concern for mass   - duplex showed BL DVT in PVT  PESI Score - 62 point s class 1, very low risk 0-1.6% 30 day mortality    PLAN  Heparin gtt transition to DOAC when able -> plan for eliquis on DC  admit to telemetry + pulse ox  hyprcoagulable labs  PT eval   pain - IV tylenol

## 2024-12-04 NOTE — PROGRESS NOTE ADULT - ATTENDING COMMENTS
54M Retinitis pigmentosa - legally blind, horseshoe kidney s/p Rt pyeloplasty, recurrent diverticulitis p/w acute PE/DVT.    - acute PE/DVT - diagnosed on CTPA; monitor O2 status while ambulating; TTE reviewed - no Cor pulmonale; hypercoagulable w/u pending - CT of A/P reviewed - no notable malignancy; LE doppler DVT present; check ambulatory oxygen saturation; Eliquis for A/C  - if no hypoxia, can be d/c'd home

## 2024-12-04 NOTE — PHYSICAL THERAPY INITIAL EVALUATION ADULT - PERTINENT HX OF CURRENT PROBLEM, REHAB EVAL
Patient is a 54 year old male with vision loss presenting with acute bilateral segmental and subsegmental PE. No sign of right heart strain. Hemodynamically stable. Currently on heparin GTT with plan to transition to oral anticoagulation.

## 2024-12-04 NOTE — PROGRESS NOTE ADULT - PROBLEM SELECTOR PLAN 3
Found to have horseshoe kidney during initial work-up of diverticulitis - asymptomatic prior.  Found to have  severe R hydro and cortical thinning. Has 7mm RLP stone and UPJ obstruction   10/21 - RAL R pyeloplasty and stent placement  No episodes of pyelo.    PLAN  - NTD   - follow-up outpatient for annual US Diagnosed with diverticulitis when he was 39. Started on a course of cipro for diverticulitis about 3 weeks prior to current admission.     Follows with Dr. Connor Cox    PLAN  - high fiber diet

## 2024-12-04 NOTE — DISCHARGE NOTE PROVIDER - NSFOLLOWUPCLINICS_GEN_ALL_ED_FT
[facility]  Hematology/Oncology  [address] [address]  Phone: [phone]  Fax:   Follow Up Time: 2 weeks

## 2024-12-04 NOTE — DISCHARGE NOTE NURSING/CASE MANAGEMENT/SOCIAL WORK - PATIENT PORTAL LINK FT
You can access the FollowMyHealth Patient Portal offered by [facility] by registering at the following website: [email or web address]. By joining [employer]’s FollowMyHealth portal, you will also be able to view your health information using other applications (apps) compatible with our system.

## 2024-12-04 NOTE — DISCHARGE NOTE NURSING/CASE MANAGEMENT/SOCIAL WORK - FINANCIAL ASSISTANCE
[facility] provides services at a reduced cost to those who are determined to be eligible through [facility]’s financial assistance program. Information regarding [facility]’s financial assistance program can be found by going to [email or web address] or by calling [phone].

## 2024-12-04 NOTE — DISCHARGE NOTE PROVIDER - CARE PROVIDER_API CALL
Douglas Kauffman  Internal Medicine  [address] [place] [address]  [address]  [place] [address]  Phone: [phone]  Established Patient  Follow Up Time: 2 weeks

## 2024-12-04 NOTE — PROGRESS NOTE ADULT - ASSESSMENT
Patient is a 53 yo male with PMHx of right horseshoe kidney s/p  R pyeloplasty in 2021, retinitis pigmentosa c/b cataract, recurrent diverticulitis presenting with acute bilateral segmental and subsegmental PE. No sign of right heart strain. Hemodynamically stable. Currently on heparin GTT with plan to transition to DOAC. Yes

## 2024-12-04 NOTE — PROGRESS NOTE ADULT - SUBJECTIVE AND OBJECTIVE BOX
PROGRESS NOTE    AUSTIN GREENWOOD ([id number])- Patient is a 54y old  Male who presents with a chief complaint of SOB (03 Dec 2024 12:38)      INTERVAL HPI/OVERNIGHT EVENTS:   Patient has no acute events overnight,     SUBJECTIVE: Pt seen at bedside; denies n/v, sob, chest pain.     MEDICATIONS:  acetaminophen     Tablet .. 650 milliGRAM(s) Oral every 6 hours PRN  aluminum hydroxide/magnesium hydroxide/simethicone Suspension 30 milliLiter(s) Oral every 4 hours PRN  heparin   Injectable 7000 Unit(s) IV Push every 6 hours PRN  heparin   Injectable 3500 Unit(s) IV Push every 6 hours PRN  heparin  Infusion. 1600 Unit(s)/Hr IV Continuous <Continuous>  melatonin 3 milliGRAM(s) Oral at bedtime PRN  ondansetron Injectable 4 milliGRAM(s) IV Push every 8 hours PRN      PHYSICAL EXAM:  Vital Signs Last 24 Hrs  T(C): 36.7 (04 Dec 2024 06:00), Max: 36.9 (03 Dec 2024 17:03)  T(F): 98 (04 Dec 2024 06:00), Max: 98.4 (03 Dec 2024 17:03)  HR: 74 (04 Dec 2024 06:00) (74 - 76)  BP: 116/72 (04 Dec 2024 06:00) (116/72 - 126/81)  BP(mean): --  RR: 18 (04 Dec 2024 06:00) (18 - 18)  SpO2: 96% (04 Dec 2024 06:00) (96% - 99%)    Parameters below as of 04 Dec 2024 06:00  Patient On (Oxygen Delivery Method): room air        GENERAL: NAD, lying in bed comfortably  HEAD:  Atraumatic, Normocephalic  EYES: EOMI, PERRLA. No scleral incterus and no conjunctival injection. Tracks me in room  ENT: Moist mucous membranes  NECK: Supple, No JVD  CHEST/LUNG: Clear to auscultation bilaterally; No rales, rhonchi, wheezing, or rubs. Unlabored respirations  HEART: Regular rate and rhythm; No murmurs, rubs, or gallops  ABDOMEN: BS +; Soft, nontender, nondistended  EXTREMITIES:  2+ Peripheral Pulses, brisk capillary refill. No clubbing, cyanosis, or edema  NERVOUS SYSTEM:  A&Ox3, no focal neurological deficits. CN II-XII grossly intact, but not individually tested.  PSYCHIATRIC: Cooperative. Appropriate mood and affect.  SKIN: No rashes or lesions    LABS:                          12.8   3.54  )-----------( 186      ( 04 Dec 2024 06:42 )             38.4     12-04    141  |  106  |  14  ----------------------------<  98  3.6   |  24  |  0.77    Ca    8.9      04 Dec 2024 06:42  Phos  3.7     12-04  Mg     2.20     12-04    TPro  6.2  /  Alb  3.2[L]  /  TBili  0.4  /  DBili  x   /  AST  19  /  ALT  17  /  AlkPhos  60  12-04          Urinalysis Basic - ( 04 Dec 2024 06:42 )    Color: x / Appearance: x / SG: x / pH: x  Gluc: 98 mg/dL / Ketone: x  / Bili: x / Urobili: x   Blood: x / Protein: x / Nitrite: x   Leuk Esterase: x / RBC: x / WBC x   Sq Epi: x / Non Sq Epi: x / Bacteria: x      PT/INR - ( 03 Dec 2024 05:53 )   PT: 11.9 sec;   INR: 1.03 ratio         PTT - ( 04 Dec 2024 06:42 )  PTT:70.5 sec  Lactate Trend        CAPILLARY BLOOD GLUCOSE              RADIOLOGY & ADDITIONAL TESTS were viewed by me personally.   EKG:    PROGRESS NOTE    AUSTIN GREENWOOD ([id number])- Patient is a 54y old  Male who presents with a chief complaint of SOB (03 Dec 2024 12:38)      INTERVAL HPI/OVERNIGHT EVENTS:   Patient has no acute events overnight,     SUBJECTIVE: Pt seen at bedside; denies n/v. Patient endorses significantly improved SOB. Still has pleuritic chest pain that is improving    MEDICATIONS:  acetaminophen     Tablet .. 650 milliGRAM(s) Oral every 6 hours PRN  aluminum hydroxide/magnesium hydroxide/simethicone Suspension 30 milliLiter(s) Oral every 4 hours PRN  heparin   Injectable 7000 Unit(s) IV Push every 6 hours PRN  heparin   Injectable 3500 Unit(s) IV Push every 6 hours PRN  heparin  Infusion. 1600 Unit(s)/Hr IV Continuous <Continuous>  melatonin 3 milliGRAM(s) Oral at bedtime PRN  ondansetron Injectable 4 milliGRAM(s) IV Push every 8 hours PRN      PHYSICAL EXAM:  Vital Signs Last 24 Hrs  T(C): 36.7 (04 Dec 2024 06:00), Max: 36.9 (03 Dec 2024 17:03)  T(F): 98 (04 Dec 2024 06:00), Max: 98.4 (03 Dec 2024 17:03)  HR: 74 (04 Dec 2024 06:00) (74 - 76)  BP: 116/72 (04 Dec 2024 06:00) (116/72 - 126/81)  BP(mean): --  RR: 18 (04 Dec 2024 06:00) (18 - 18)  SpO2: 96% (04 Dec 2024 06:00) (96% - 99%)    Parameters below as of 04 Dec 2024 06:00  Patient On (Oxygen Delivery Method): room air        GENERAL: NAD, lying in bed comfortably  HEAD:  Atraumatic, Normocephalic  EYES: Visually impaired EOMI, PERRLA. No scleral icterus and no conjunctival injection.  ENT: Moist mucous membranes  NECK: Supple, No JVD  CHEST/LUNG: Clear to auscultation bilaterally; No rales, rhonchi, wheezing, or rubs. Unlabored respirations  HEART: Regular rate and rhythm; No murmurs, rubs, or gallops  ABDOMEN: BS +; Soft, nontender, nondistended  EXTREMITIES:  2+ Peripheral Pulses, brisk capillary refill. No clubbing, cyanosis, or edema  NERVOUS SYSTEM:  A&Ox3, no focal neurological deficits. CN II-XII grossly intact, but not individually tested.  PSYCHIATRIC: Cooperative. Appropriate mood and affect.  SKIN: No rashes or lesions    LABS:                          12.8   3.54  )-----------( 186      ( 04 Dec 2024 06:42 )             38.4     12-04    141  |  106  |  14  ----------------------------<  98  3.6   |  24  |  0.77    Ca    8.9      04 Dec 2024 06:42  Phos  3.7     12-04  Mg     2.20     12-04    TPro  6.2  /  Alb  3.2[L]  /  TBili  0.4  /  DBili  x   /  AST  19  /  ALT  17  /  AlkPhos  60  12-04          Urinalysis Basic - ( 04 Dec 2024 06:42 )    Color: x / Appearance: x / SG: x / pH: x  Gluc: 98 mg/dL / Ketone: x  / Bili: x / Urobili: x   Blood: x / Protein: x / Nitrite: x   Leuk Esterase: x / RBC: x / WBC x   Sq Epi: x / Non Sq Epi: x / Bacteria: x      PT/INR - ( 03 Dec 2024 05:53 )   PT: 11.9 sec;   INR: 1.03 ratio         PTT - ( 04 Dec 2024 06:42 )  PTT:70.5 sec  Lactate Trend        CAPILLARY BLOOD GLUCOSE              RADIOLOGY & ADDITIONAL TESTS were viewed by me personally.   EKG:

## 2024-12-04 NOTE — PROGRESS NOTE ADULT - PROBLEM SELECTOR PLAN 2
Diagnosed with diverticulitis when he was 39. Started on a course of cipro for diverticulitis about 3 weeks prior to current admission.     Follows with Dr. Connor Cox    PLAN  - high fiber diet - Duplex showed acute DVT in BL PVT  - management same as above for PE

## 2024-12-04 NOTE — PHYSICAL THERAPY INITIAL EVALUATION ADULT - NSPTDISCHREC_GEN_A_CORE
home with no skilled PT needs. Will be taken off inpatient PT program, patient is at his functional baseline/No skilled PT needs

## 2024-12-04 NOTE — PHYSICAL THERAPY INITIAL EVALUATION ADULT - MANUAL MUSCLE TESTING RESULTS, REHAB EVAL
Patients bilateral upper and lower extremity strength grossly 5/5 upon MMT and functional assessment

## 2024-12-04 NOTE — DISCHARGE NOTE PROVIDER - NSDCMRMEDTOKEN_GEN_ALL_CORE_FT
Eliquis Starter Pack for Treatment of DVT and PE 5 mg oral tablet: 2 tab(s) orally 2 times a day Please follow instructions on pack, take 10 mg twice daily for the first 7 days, followed by 5 mg twice daily  Lutein: 1 application  omega-3 polyunsaturated fatty acids: 1 application  vitamin A: 1 application

## 2024-12-04 NOTE — DISCHARGE NOTE PROVIDER - NSDCCPTREATMENT_GEN_ALL_CORE_FT
PRINCIPAL PROCEDURE  Procedure: CT angiogram chest w contrast  Findings and Treatment: LUNGS ANDLARGE AIRWAYS: Patent central airways. Small wedge-shaped right   basilar consolidation, may represent subsegmental atelectasis or   pulmonary infarct.  PLEURA: Small right pleural effusion.  VESSELS: Extensive right upper, middle, and right lower segmental and   subsegmental, as well as left lower lobe subsegmental acute pulmonary   emboli.  HEART: Heart size is normal. No pericardial effusion. No CT evidence of   right heart strain. RV/LV ratio of approximately 1.  MEDIASTINUM AND ANTHONY: No lymphadenopathy.  CHEST WALL AND LOWER NECK: Within normal limits.  VISUALIZED UPPER ABDOMEN: A too small to characterize right hepatic   hypodensity on 3 1:119. A 1 cm hepatic cyst is identified on the 1:111.  BONES: Within normal limits.  IMPRESSION:  Extensive acute bilateral segmental and subsegmental PE. No CT evidence   of right heart strain.  Small right pleural effusion.  Small wedge-shaped right basilar consolidation, favoring pulmonary   infarct versus atelectasis.        SECONDARY PROCEDURE  Procedure: CT abdomen pelvis  Findings and Treatment: LOWER CHEST: Redemonstrated known bibasilar pulmonary embolus. Small   right pleural effusion with adjacent passive atelectasis. No evidence of   right heart strain.  LIVER: Left hepatic lobe cyst measuring 10 mm. Subcentimeter lesions too   small to characterize.  BILE DUCTS: Normal caliber.  GALLBLADDER: Within normal limits.  SPLEEN: Within normal limits.  PANCREAS: Within normal limits.  ADRENALS: Within normal limits.  KIDNEYS/URETERS: Horseshoe kidney. 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 Retained excreted contrast in the right renal collecting   system.  BLADDER: Within normal limits.  REPRODUCTIVE ORGANS: Enlarged prostate.  BOWEL: No bowel obstruction. Appendix is not visualized. Diverticulosis,   without evidence of acute diverticulitis.  PERITONEUM/RETROPERITONEUM: Within normal limits.  VESSELS: Within normal limits.  LYMPH NODES: No lymphadenopathy.  ABDOMINAL WALL: Within normal limits.  BONES: Within normal limits.  IMPRESSION:  No evidence of suspicious mass or adenopathy in the abdomen and pelvis.  Redemonstrated pulmonary embolus at the lung bases. No evidence of right   heart strain.      Procedure: Chest xray, PA & lateral  Findings and Treatment: The heart is normal in size.  The lungs are clear.  There is no pneumothorax or pleural effusion.  No acute osseous abnormalities.  IMPRESSION:  Clear lungs.      Procedure: Complete transthoracic echocardiography (TTE)  Findings and Treatment: Left Ventricle:  The left ventricular cavity is normal in size. Left ventricular wall thickness is normal. Left ventricular systolic function is normal with a calculated ejection fraction of 65 % by the Hollis's biplane method of disks. There are no regional wall motion abnormalities seen. There is normal left ventricular diastolic function. Left ventricular global longitudinal strain is -19.5 % which isnormal (< -18%). Images were acquired on a [employer] ultrasound system and processed on the ultrasound machine with a heart rate of 75 bpm and a blood pressure of 113/80 mmHg.  Right Ventricle:  The right ventricular cavity is normal in size and right ventricular systolic function is probably normal. Tricuspid annular plane systolic excursion (TAPSE) is 2.7 cm (normal >=1.7 cm).  Left Atrium:  The left atrium is normal in size with an indexed volume of 26.53 ml/m².  Right Atrium:  The right atrium is normal in size with an indexed volume of 20.79 ml/m² and an indexed area of 7.48 cm²/m².  Interatrial Septum:  The atrial septum appears intact.  Aortic Valve:  The aortic valve anatomy cannot be determined. There is no evidence of aortic regurgitation.  Mitral Valve:  Structurally normal mitral valve with normal leaflet excursion. There is trace mitral regurgitation.  Tricuspid Valve:  Structurally normal tricuspid valve with normal leaflet excursion. There is trace tricuspid regurgitation. There is insufficient tricuspid regurgitation detected to calculate pulmonary artery systolic pressure.  Pulmonic Valve:  The pulmonic valve was not well visualized.  Aorta:  The aortic root at the sinuses of Valsalva is normal in size, measuring 3.00 cm (indexed 1.41 cm/m²). The ascending aorta is normal in size, measuring 2.70 cm (indexed 1.27 cm/m²).     Pericardium:  No pericardial effusion seen.    Procedure: US venous duplex scan extremity lower bilateral  Findings and Treatment: Right Lower Extremity Deep Veins:  R CFV: The right common femoral vein was normally compressible. Spectral Doppler flow pattern was normal.  R Fem: The right femoral vein was normally compressible. Spectral Doppler flow pattern was normal.  R Pop: The right popliteal vein was normally compressible. Spectral Doppler flow pattern was normal.  R Gastrocnemius: The right gastrocnemius vein was normally compressible.  R PTV: The right posterior tibial vein was partially compressible. Spectral Doppler flow pattern was abnormal. There is non-occlusive deep vein thrombosis in the right posterior tibial vein. The thrombus is hypoechoic and deformable with probe pressure suggestive of acute deep vein thrombosis.  R Per: The right peroneal vein was normally compressible.     Left Lower Extremity Deep Veins:  Acute, occlusive deep vein thrombosis is noted within the left tibioperoneal trunk.  L CFV: The left common femoral vein was normally compressible. Spectral Doppler flow pattern was normal.  L Fem: The left femoral vein was normally compressible. Spectral Doppler flow pattern was normal.  L Pop: The left popliteal vein was normally compressible. Spectral Doppler flow pattern was normal.  L Gastrocnemius: The left gastrocnemius vein was normally compressible.  L PTV: The left posterior tibial vein was not compressible. There is occlusive deep vein thrombosis in the left posterior tibial vein. The thrombus is hypoechoic and deformable with probe pressure suggestive of acute deep vein thrombosis.  L Per: The left peroneal vein was normally compressible.

## 2024-12-04 NOTE — PHYSICAL THERAPY INITIAL EVALUATION ADULT - ADDITIONAL COMMENTS
Patient lives in a private home with 4 steps to enter with bilateral handrails, +flight of stairs to bedroom. Patient ambulatory with walking stick due to vision loss and assistance at baseline. Reports he primarily stays home and his wife assists him in the community with ambulation. Reports he tries to avoid stairs outside of his home due to unfamiliarity.     Patient left in bed, NAD, all lines and tubes intact, bed alarm on, call liz within reach BRIAN Koenig aware of PT

## 2024-12-04 NOTE — DISCHARGE NOTE PROVIDER - NSDCCPCAREPLAN_GEN_ALL_CORE_FT
PRINCIPAL DISCHARGE DIAGNOSIS  Diagnosis: Pulmonary embolism  Assessment and Plan of Treatment: You were treated for a pulmonary embolism (or "PE") is a blockage in one or more of the blood vessels that supply blood to the lungs. Most often these blockages are caused by blood clots that form elsewhere and then travel to the lungs. Having blocked arteries in the lung can also make it hard to breathe, lead to lung damage, and death by preventing the rest of your body from getting enough oxygen.   While in the hospital, you were treated with heparin to clear the clot. You should continue to take the blood thinner medication Eliquis to prevent the development of clots in the future.   Please follow up with your primary care provider and hematologist.   If you suddenly experience shortness of breath, or trouble breathing, sharp, knife-like chest pain when you breathe in or strain, coughing or coughing up blood or simply a rapid heartbeat, please seek emergency medical care to assess for another clot development.      SECONDARY DISCHARGE DIAGNOSES  Diagnosis: DVT, lower extremity  Assessment and Plan of Treatment: A deep vein thrombosis (DVT) is a blood clot (thrombus) that usually occurs in a deep, larger vein of the lower leg or the pelvis, or in an upper extremity such as the arm. These are dangerous and can lead to serious and even life-threatening complications if the clot travels to the lungs. Symptoms include swelling of the arm or leg, warmth and redness of the arm or leg, and pain.   While in the hospital, you were treated with heparin to clear the clot. You should continue to take the blood thinner medication Eliquis to prevent the development of clots in the future.   Please follow up with your primary care provider and cardiologist.   If you suddenly experience shortness of breath, or trouble breathing, sharp, knife-like chest pain when you breathe in or strain, coughing or coughing up blood or simply a rapid heartbeat, please seek emergency medical care to assess for another clot development.

## 2024-12-05 LAB
DNA PLOIDY SPEC FC-IMP: SIGNIFICANT CHANGE UP
PTR INTERPRETATION: SIGNIFICANT CHANGE UP

## 2024-12-05 NOTE — PHARMACOTHERAPY INTERVENTION NOTE - COMMENTS
Discharge medications reviewed with the patient's wife over the phone. Current medication schedule was discussed in detail including: medication name, indication, dose, administration times, treatment duration, side effects, and special instructions. Educated on apixaban including the purpose and administration. Reviewed dose change from 10mg BID to 5mg BID on 12/11/24. Discussed adverse effects in detail and when to seek medical attention (blood in stool, vomit, etc.). Informed patient's wife to notify all providers he is on this and before any planned procedures. Advised to avoid NSAIDs to limit risk of bleeding. Noted that patient is on omega-3 fatty acids to improve his vision. Informed pt's wife that fish oil may increase the risk of bleeding and to discuss continued use with his PCP. Questions and concerns were answered and addressed. Patient's wife demonstrated understanding.     New medications: apixaban    Alyssa Sorensen, PharmD, BCPS  Clinical Pharmacy Specialist  [id number]

## 2024-12-11 ENCOUNTER — APPOINTMENT (OUTPATIENT)
Dept: UROLOGY | Facility: CLINIC | Age: 54
End: 2024-12-11
Payer: COMMERCIAL

## 2024-12-11 ENCOUNTER — NON-APPOINTMENT (OUTPATIENT)
Age: 54
End: 2024-12-11

## 2024-12-11 VITALS
WEIGHT: 190 LBS | HEART RATE: 63 BPM | DIASTOLIC BLOOD PRESSURE: 82 MMHG | RESPIRATION RATE: 16 BRPM | HEIGHT: 74 IN | OXYGEN SATURATION: 98 % | SYSTOLIC BLOOD PRESSURE: 124 MMHG | BODY MASS INDEX: 24.38 KG/M2

## 2024-12-11 DIAGNOSIS — N50.819 TESTICULAR PAIN, UNSPECIFIED: ICD-10-CM

## 2024-12-11 DIAGNOSIS — K57.32 DIVERTICULITIS OF LARGE INTESTINE W/OUT PERFORATION OR ABSCESS W/OUT BLEEDING: ICD-10-CM

## 2024-12-11 DIAGNOSIS — Z30.09 ENCOUNTER FOR OTHER GENERAL COUNSELING AND ADVICE ON CONTRACEPTION: ICD-10-CM

## 2024-12-11 DIAGNOSIS — R35.1 NOCTURIA: ICD-10-CM

## 2024-12-11 DIAGNOSIS — N45.1 EPIDIDYMITIS: ICD-10-CM

## 2024-12-11 DIAGNOSIS — I26.99 OTHER PULMONARY EMBOLISM W/OUT ACUTE COR PULMONALE: ICD-10-CM

## 2024-12-11 DIAGNOSIS — R35.0 FREQUENCY OF MICTURITION: ICD-10-CM

## 2024-12-11 DIAGNOSIS — N20.0 CALCULUS OF KIDNEY: ICD-10-CM

## 2024-12-11 PROBLEM — I82.409 DVT (DEEP VENOUS THROMBOSIS): Status: ACTIVE | Noted: 2024-12-11

## 2024-12-11 LAB — APCR PPP: 2.52 RATIO — SIGNIFICANT CHANGE UP

## 2024-12-11 PROCEDURE — 99215 OFFICE O/P EST HI 40 MIN: CPT

## 2024-12-11 RX ORDER — APIXABAN 5 MG/1
TABLET, FILM COATED ORAL
Refills: 0 | Status: ACTIVE | COMMUNITY

## 2024-12-12 LAB
APPEARANCE: CLEAR
BILIRUBIN URINE: NEGATIVE
BLOOD URINE: NEGATIVE
COLOR: YELLOW
GLUCOSE QUALITATIVE U: NEGATIVE MG/DL
KETONES URINE: NEGATIVE MG/DL
LEUKOCYTE ESTERASE URINE: NEGATIVE
NITRITE URINE: NEGATIVE
PH URINE: 6
PROTEIN URINE: NEGATIVE MG/DL
SPECIFIC GRAVITY URINE: 1.01
UROBILINOGEN URINE: 0.2 MG/DL

## 2024-12-18 ENCOUNTER — APPOINTMENT (OUTPATIENT)
Dept: CARDIOLOGY | Facility: CLINIC | Age: 54
End: 2024-12-18
Payer: COMMERCIAL

## 2024-12-18 ENCOUNTER — NON-APPOINTMENT (OUTPATIENT)
Age: 54
End: 2024-12-18

## 2024-12-18 VITALS
BODY MASS INDEX: 24.14 KG/M2 | OXYGEN SATURATION: 100 % | HEART RATE: 78 BPM | DIASTOLIC BLOOD PRESSURE: 80 MMHG | WEIGHT: 188 LBS | SYSTOLIC BLOOD PRESSURE: 112 MMHG

## 2024-12-18 DIAGNOSIS — Q63.1 LOBULATED, FUSED AND HORSESHOE KIDNEY: ICD-10-CM

## 2024-12-18 DIAGNOSIS — I82.409 ACUTE EMBOLISM AND THROMBOSIS OF UNSPECIFIED DEEP VEINS OF UNSPECIFIED LOWER EXTREMITY: ICD-10-CM

## 2024-12-18 DIAGNOSIS — I26.99 OTHER PULMONARY EMBOLISM W/OUT ACUTE COR PULMONALE: ICD-10-CM

## 2024-12-18 DIAGNOSIS — N20.0 LOBULATED, FUSED AND HORSESHOE KIDNEY: ICD-10-CM

## 2024-12-18 PROCEDURE — 99215 OFFICE O/P EST HI 40 MIN: CPT | Mod: 25

## 2024-12-18 PROCEDURE — 93000 ELECTROCARDIOGRAM COMPLETE: CPT

## 2024-12-18 PROCEDURE — 99205 OFFICE O/P NEW HI 60 MIN: CPT | Mod: 25

## 2024-12-18 RX ORDER — APIXABAN 5 MG/1
5 TABLET, FILM COATED ORAL
Qty: 180 | Refills: 1 | Status: ACTIVE | COMMUNITY
Start: 2024-12-18 | End: 1900-01-01

## 2024-12-30 ENCOUNTER — APPOINTMENT (OUTPATIENT)
Dept: CT IMAGING | Facility: IMAGING CENTER | Age: 54
End: 2024-12-30

## 2024-12-30 ENCOUNTER — OUTPATIENT (OUTPATIENT)
Dept: OUTPATIENT SERVICES | Facility: HOSPITAL | Age: 54
LOS: 1 days | End: 2024-12-30
Payer: COMMERCIAL

## 2024-12-30 DIAGNOSIS — S68.119A COMPLETE TRAUMATIC METACARPOPHALANGEAL AMPUTATION OF UNSPECIFIED FINGER, INITIAL ENCOUNTER: Chronic | ICD-10-CM

## 2024-12-30 DIAGNOSIS — Z98.890 OTHER SPECIFIED POSTPROCEDURAL STATES: Chronic | ICD-10-CM

## 2024-12-30 DIAGNOSIS — I26.99 OTHER PULMONARY EMBOLISM WITHOUT ACUTE COR PULMONALE: ICD-10-CM

## 2024-12-30 DIAGNOSIS — I82.409 ACUTE EMBOLISM AND THROMBOSIS OF UNSPECIFIED DEEP VEINS OF UNSPECIFIED LOWER EXTREMITY: ICD-10-CM

## 2024-12-30 DIAGNOSIS — Z98.49 CATARACT EXTRACTION STATUS, UNSPECIFIED EYE: Chronic | ICD-10-CM

## 2024-12-30 PROCEDURE — 74177 CT ABD & PELVIS W/CONTRAST: CPT | Mod: 26

## 2024-12-30 PROCEDURE — 74177 CT ABD & PELVIS W/CONTRAST: CPT

## 2025-01-08 ENCOUNTER — TRANSCRIPTION ENCOUNTER (OUTPATIENT)
Age: 55
End: 2025-01-08

## 2025-01-09 ENCOUNTER — APPOINTMENT (OUTPATIENT)
Dept: ULTRASOUND IMAGING | Facility: IMAGING CENTER | Age: 55
End: 2025-01-09
Payer: COMMERCIAL

## 2025-01-09 ENCOUNTER — RESULT REVIEW (OUTPATIENT)
Age: 55
End: 2025-01-09

## 2025-01-09 ENCOUNTER — APPOINTMENT (OUTPATIENT)
Dept: RADIOLOGY | Facility: IMAGING CENTER | Age: 55
End: 2025-01-09
Payer: COMMERCIAL

## 2025-01-09 ENCOUNTER — OUTPATIENT (OUTPATIENT)
Dept: OUTPATIENT SERVICES | Facility: HOSPITAL | Age: 55
LOS: 1 days | End: 2025-01-09
Payer: COMMERCIAL

## 2025-01-09 DIAGNOSIS — Z98.890 OTHER SPECIFIED POSTPROCEDURAL STATES: Chronic | ICD-10-CM

## 2025-01-09 DIAGNOSIS — Z98.49 CATARACT EXTRACTION STATUS, UNSPECIFIED EYE: Chronic | ICD-10-CM

## 2025-01-09 DIAGNOSIS — S68.119A COMPLETE TRAUMATIC METACARPOPHALANGEAL AMPUTATION OF UNSPECIFIED FINGER, INITIAL ENCOUNTER: Chronic | ICD-10-CM

## 2025-01-09 DIAGNOSIS — N13.30 UNSPECIFIED HYDRONEPHROSIS: ICD-10-CM

## 2025-01-09 DIAGNOSIS — Q63.1 LOBULATED, FUSED AND HORSESHOE KIDNEY: ICD-10-CM

## 2025-01-09 DIAGNOSIS — N20.0 CALCULUS OF KIDNEY: ICD-10-CM

## 2025-01-09 PROCEDURE — 74018 RADEX ABDOMEN 1 VIEW: CPT

## 2025-01-09 PROCEDURE — 74018 RADEX ABDOMEN 1 VIEW: CPT | Mod: 26

## 2025-01-09 PROCEDURE — 76770 US EXAM ABDO BACK WALL COMP: CPT | Mod: 26

## 2025-01-09 PROCEDURE — 76770 US EXAM ABDO BACK WALL COMP: CPT

## 2025-01-10 ENCOUNTER — TRANSCRIPTION ENCOUNTER (OUTPATIENT)
Age: 55
End: 2025-01-10

## 2025-01-14 ENCOUNTER — OUTPATIENT (OUTPATIENT)
Dept: OUTPATIENT SERVICES | Facility: HOSPITAL | Age: 55
LOS: 1 days | Discharge: HOME | End: 2025-01-14

## 2025-01-14 DIAGNOSIS — Z98.49 CATARACT EXTRACTION STATUS, UNSPECIFIED EYE: Chronic | ICD-10-CM

## 2025-01-14 DIAGNOSIS — I26.99 OTHER PULMONARY EMBOLISM WITHOUT ACUTE COR PULMONALE: ICD-10-CM

## 2025-01-14 DIAGNOSIS — Z98.890 OTHER SPECIFIED POSTPROCEDURAL STATES: Chronic | ICD-10-CM

## 2025-01-14 DIAGNOSIS — S68.119A COMPLETE TRAUMATIC METACARPOPHALANGEAL AMPUTATION OF UNSPECIFIED FINGER, INITIAL ENCOUNTER: Chronic | ICD-10-CM

## 2025-01-15 ENCOUNTER — NON-APPOINTMENT (OUTPATIENT)
Age: 55
End: 2025-01-15

## 2025-01-15 ENCOUNTER — RESULT REVIEW (OUTPATIENT)
Age: 55
End: 2025-01-15

## 2025-01-15 ENCOUNTER — APPOINTMENT (OUTPATIENT)
Dept: HEMATOLOGY ONCOLOGY | Facility: CLINIC | Age: 55
End: 2025-01-15
Payer: COMMERCIAL

## 2025-01-15 VITALS
BODY MASS INDEX: 25.04 KG/M2 | SYSTOLIC BLOOD PRESSURE: 133 MMHG | OXYGEN SATURATION: 99 % | WEIGHT: 195.11 LBS | DIASTOLIC BLOOD PRESSURE: 82 MMHG | TEMPERATURE: 97.7 F | HEIGHT: 74.02 IN | HEART RATE: 86 BPM | RESPIRATION RATE: 16 BRPM

## 2025-01-15 DIAGNOSIS — I26.99 OTHER PULMONARY EMBOLISM W/OUT ACUTE COR PULMONALE: ICD-10-CM

## 2025-01-15 LAB
BASOPHILS # BLD AUTO: 0.03 K/UL — SIGNIFICANT CHANGE UP (ref 0–0.2)
BASOPHILS NFR BLD AUTO: 0.6 % — SIGNIFICANT CHANGE UP (ref 0–2)
DEPRECATED D DIMER PPP IA-ACNC: <150 NG/ML DDU
EOSINOPHIL # BLD AUTO: 0.18 K/UL — SIGNIFICANT CHANGE UP (ref 0–0.5)
EOSINOPHIL NFR BLD AUTO: 3.9 % — SIGNIFICANT CHANGE UP (ref 0–6)
HCT VFR BLD CALC: 42.3 % — SIGNIFICANT CHANGE UP (ref 39–50)
HGB BLD-MCNC: 14.4 G/DL — SIGNIFICANT CHANGE UP (ref 13–17)
IMM GRANULOCYTES NFR BLD AUTO: 0.9 % — SIGNIFICANT CHANGE UP (ref 0–0.9)
LYMPHOCYTES # BLD AUTO: 1.65 K/UL — SIGNIFICANT CHANGE UP (ref 1–3.3)
LYMPHOCYTES # BLD AUTO: 35.5 % — SIGNIFICANT CHANGE UP (ref 13–44)
MCHC RBC-ENTMCNC: 29.4 PG — SIGNIFICANT CHANGE UP (ref 27–34)
MCHC RBC-ENTMCNC: 34 G/DL — SIGNIFICANT CHANGE UP (ref 32–36)
MCV RBC AUTO: 86.5 FL — SIGNIFICANT CHANGE UP (ref 80–100)
MONOCYTES # BLD AUTO: 0.43 K/UL — SIGNIFICANT CHANGE UP (ref 0–0.9)
MONOCYTES NFR BLD AUTO: 9.2 % — SIGNIFICANT CHANGE UP (ref 2–14)
NEUTROPHILS # BLD AUTO: 2.32 K/UL — SIGNIFICANT CHANGE UP (ref 1.8–7.4)
NEUTROPHILS NFR BLD AUTO: 49.9 % — SIGNIFICANT CHANGE UP (ref 43–77)
NRBC # BLD: 0 /100 WBCS — SIGNIFICANT CHANGE UP (ref 0–0)
NRBC BLD-RTO: 0 /100 WBCS — SIGNIFICANT CHANGE UP (ref 0–0)
PLATELET # BLD AUTO: 164 K/UL — SIGNIFICANT CHANGE UP (ref 150–400)
RBC # BLD: 4.89 M/UL — SIGNIFICANT CHANGE UP (ref 4.2–5.8)
RBC # FLD: 13.1 % — SIGNIFICANT CHANGE UP (ref 10.3–14.5)
WBC # BLD: 4.65 K/UL — SIGNIFICANT CHANGE UP (ref 3.8–10.5)
WBC # FLD AUTO: 4.65 K/UL — SIGNIFICANT CHANGE UP (ref 3.8–10.5)

## 2025-01-15 PROCEDURE — 99205 OFFICE O/P NEW HI 60 MIN: CPT

## 2025-01-16 LAB
ALBUMIN SERPL ELPH-MCNC: 4.5 G/DL
ALP BLD-CCNC: 75 U/L
ALT SERPL-CCNC: 11 U/L
ANION GAP SERPL CALC-SCNC: 13 MMOL/L
AST SERPL-CCNC: 14 U/L
BILIRUB SERPL-MCNC: 0.3 MG/DL
BUN SERPL-MCNC: 17 MG/DL
CALCIUM SERPL-MCNC: 9.7 MG/DL
CHLORIDE SERPL-SCNC: 105 MMOL/L
CO2 SERPL-SCNC: 24 MMOL/L
CREAT SERPL-MCNC: 0.91 MG/DL
EGFR: 101 ML/MIN/1.73M2
GLUCOSE SERPL-MCNC: 84 MG/DL
POTASSIUM SERPL-SCNC: 4.3 MMOL/L
PROT SERPL-MCNC: 6.8 G/DL
SODIUM SERPL-SCNC: 142 MMOL/L

## 2025-02-11 ENCOUNTER — TRANSCRIPTION ENCOUNTER (OUTPATIENT)
Age: 55
End: 2025-02-11

## 2025-03-19 ENCOUNTER — APPOINTMENT (OUTPATIENT)
Dept: CARDIOLOGY | Facility: CLINIC | Age: 55
End: 2025-03-19

## 2025-03-19 VITALS
SYSTOLIC BLOOD PRESSURE: 140 MMHG | BODY MASS INDEX: 25.41 KG/M2 | HEART RATE: 89 BPM | OXYGEN SATURATION: 98 % | DIASTOLIC BLOOD PRESSURE: 80 MMHG | WEIGHT: 198 LBS

## 2025-03-19 DIAGNOSIS — I82.409 ACUTE EMBOLISM AND THROMBOSIS OF UNSPECIFIED DEEP VEINS OF UNSPECIFIED LOWER EXTREMITY: ICD-10-CM

## 2025-03-19 DIAGNOSIS — I26.99 OTHER PULMONARY EMBOLISM W/OUT ACUTE COR PULMONALE: ICD-10-CM

## 2025-03-19 PROCEDURE — 99215 OFFICE O/P EST HI 40 MIN: CPT

## 2025-03-19 PROCEDURE — G2211 COMPLEX E/M VISIT ADD ON: CPT | Mod: NC

## 2025-03-27 ENCOUNTER — APPOINTMENT (OUTPATIENT)
Dept: CARDIOLOGY | Facility: CLINIC | Age: 55
End: 2025-03-27
Payer: COMMERCIAL

## 2025-03-27 PROCEDURE — 93970 EXTREMITY STUDY: CPT

## 2025-06-18 ENCOUNTER — APPOINTMENT (OUTPATIENT)
Dept: CARDIOLOGY | Facility: CLINIC | Age: 55
End: 2025-06-18
Payer: COMMERCIAL

## 2025-06-18 ENCOUNTER — NON-APPOINTMENT (OUTPATIENT)
Age: 55
End: 2025-06-18

## 2025-06-18 VITALS
WEIGHT: 200 LBS | DIASTOLIC BLOOD PRESSURE: 80 MMHG | HEART RATE: 83 BPM | OXYGEN SATURATION: 98 % | SYSTOLIC BLOOD PRESSURE: 122 MMHG | BODY MASS INDEX: 25.66 KG/M2

## 2025-06-18 PROBLEM — I89.0 LYMPHEDEMA: Status: ACTIVE | Noted: 2025-06-18

## 2025-06-18 PROBLEM — M79.89 LEG SWELLING: Status: ACTIVE | Noted: 2025-06-18

## 2025-06-18 PROCEDURE — 93000 ELECTROCARDIOGRAM COMPLETE: CPT

## 2025-06-18 PROCEDURE — G2211 COMPLEX E/M VISIT ADD ON: CPT | Mod: NC

## 2025-06-18 PROCEDURE — 99215 OFFICE O/P EST HI 40 MIN: CPT

## 2025-06-18 RX ORDER — PITAVASTATIN CALCIUM 2.09 MG/1
2 TABLET, FILM COATED ORAL
Refills: 0 | Status: ACTIVE | COMMUNITY

## 2025-06-19 ENCOUNTER — APPOINTMENT (OUTPATIENT)
Dept: CARDIOLOGY | Facility: CLINIC | Age: 55
End: 2025-06-19

## 2025-06-19 PROCEDURE — 93306 TTE W/DOPPLER COMPLETE: CPT

## 2025-07-02 ENCOUNTER — TRANSCRIPTION ENCOUNTER (OUTPATIENT)
Age: 55
End: 2025-07-02

## 2025-08-22 ENCOUNTER — TRANSCRIPTION ENCOUNTER (OUTPATIENT)
Age: 55
End: 2025-08-22

## 2025-09-17 ENCOUNTER — APPOINTMENT (OUTPATIENT)
Dept: CARDIOLOGY | Facility: CLINIC | Age: 55
End: 2025-09-17